# Patient Record
Sex: MALE | Race: WHITE | NOT HISPANIC OR LATINO | URBAN - METROPOLITAN AREA
[De-identification: names, ages, dates, MRNs, and addresses within clinical notes are randomized per-mention and may not be internally consistent; named-entity substitution may affect disease eponyms.]

---

## 2021-01-15 DIAGNOSIS — Z23 NEED FOR VACCINATION: ICD-10-CM

## 2025-05-17 ENCOUNTER — HOSPITAL ENCOUNTER (OUTPATIENT)
Dept: RADIOLOGY | Facility: MEDICAL CENTER | Age: 75
End: 2025-05-17
Payer: MEDICARE

## 2025-05-17 ENCOUNTER — APPOINTMENT (OUTPATIENT)
Dept: RADIOLOGY | Facility: MEDICAL CENTER | Age: 75
DRG: 270 | End: 2025-05-17
Payer: MEDICARE

## 2025-05-17 ENCOUNTER — APPOINTMENT (OUTPATIENT)
Dept: RADIOLOGY | Facility: MEDICAL CENTER | Age: 75
DRG: 270 | End: 2025-05-17
Attending: EMERGENCY MEDICINE
Payer: MEDICARE

## 2025-05-17 ENCOUNTER — HOSPITAL ENCOUNTER (INPATIENT)
Facility: MEDICAL CENTER | Age: 75
LOS: 6 days | DRG: 270 | End: 2025-05-23
Attending: EMERGENCY MEDICINE | Admitting: STUDENT IN AN ORGANIZED HEALTH CARE EDUCATION/TRAINING PROGRAM
Payer: MEDICARE

## 2025-05-17 DIAGNOSIS — K55.039 ACUTE ISCHEMIC COLITIS (HCC): ICD-10-CM

## 2025-05-17 DIAGNOSIS — N17.9 ACUTE KIDNEY INJURY SUPERIMPOSED ON CHRONIC KIDNEY DISEASE (HCC): ICD-10-CM

## 2025-05-17 DIAGNOSIS — N18.9 ACUTE KIDNEY INJURY SUPERIMPOSED ON CHRONIC KIDNEY DISEASE (HCC): ICD-10-CM

## 2025-05-17 DIAGNOSIS — T14.90XA TRAUMA: Primary | ICD-10-CM

## 2025-05-17 DIAGNOSIS — K25.4 GASTROINTESTINAL HEMORRHAGE ASSOCIATED WITH GASTRIC ULCER: ICD-10-CM

## 2025-05-17 DIAGNOSIS — D50.0 ANEMIA DUE TO BLOOD LOSS: ICD-10-CM

## 2025-05-17 DIAGNOSIS — K52.9 COLITIS: ICD-10-CM

## 2025-05-17 PROBLEM — R55 SYNCOPAL EPISODES: Status: ACTIVE | Noted: 2025-05-17

## 2025-05-17 PROBLEM — D69.6 THROMBOCYTOPENIA (HCC): Status: ACTIVE | Noted: 2025-05-17

## 2025-05-17 PROBLEM — R35.0 BENIGN PROSTATIC HYPERPLASIA WITH URINARY FREQUENCY: Chronic | Status: ACTIVE | Noted: 2025-05-17

## 2025-05-17 PROBLEM — N40.1 BENIGN PROSTATIC HYPERPLASIA WITH URINARY FREQUENCY: Chronic | Status: ACTIVE | Noted: 2025-05-17

## 2025-05-17 PROBLEM — R94.31 PROLONGED Q-T INTERVAL ON ECG: Status: ACTIVE | Noted: 2025-05-17

## 2025-05-17 PROBLEM — K72.10 CHRONIC LIVER FAILURE WITHOUT HEPATIC COMA (HCC): Status: ACTIVE | Noted: 2025-05-17

## 2025-05-17 PROBLEM — R18.8 CIRRHOSIS OF LIVER WITH ASCITES (HCC): Chronic | Status: ACTIVE | Noted: 2025-05-17

## 2025-05-17 PROBLEM — Z53.09 CONTRAINDICATION TO DEEP VEIN THROMBOSIS (DVT) PROPHYLAXIS: Status: ACTIVE | Noted: 2025-05-17

## 2025-05-17 PROBLEM — S32.401A CLOSED FRACTURE OF RIGHT ACETABULUM (HCC): Status: ACTIVE | Noted: 2025-05-17

## 2025-05-17 PROBLEM — D68.9 COAGULOPATHY (HCC): Status: ACTIVE | Noted: 2025-05-17

## 2025-05-17 PROBLEM — R57.1 HYPOVOLEMIC SHOCK (HCC): Status: ACTIVE | Noted: 2025-05-17

## 2025-05-17 PROBLEM — K74.60 CIRRHOSIS OF LIVER WITH ASCITES (HCC): Chronic | Status: ACTIVE | Noted: 2025-05-17

## 2025-05-17 PROBLEM — K92.0 HEMATEMESIS WITH NAUSEA: Status: ACTIVE | Noted: 2025-05-17

## 2025-05-17 PROBLEM — R79.89 ELEVATED SERUM CREATININE: Status: ACTIVE | Noted: 2025-05-17

## 2025-05-17 LAB
ABO + RH BLD: NORMAL
ABO GROUP BLD: NORMAL
ALBUMIN SERPL BCP-MCNC: 2.5 G/DL (ref 3.2–4.9)
ALBUMIN/GLOB SERPL: 1.5 G/DL
ALP SERPL-CCNC: 46 U/L (ref 30–99)
ALT SERPL-CCNC: 16 U/L (ref 2–50)
ANION GAP SERPL CALC-SCNC: 16 MMOL/L (ref 7–16)
APTT PPP: 43 SEC (ref 24.7–36)
AST SERPL-CCNC: 36 U/L (ref 12–45)
BARCODED ABORH UBTYP: 5100
BARCODED ABORH UBTYP: 6200
BARCODED PRD CODE UBPRD: NORMAL
BARCODED PRD CODE UBPRD: NORMAL
BARCODED UNIT NUM UBUNT: NORMAL
BARCODED UNIT NUM UBUNT: NORMAL
BILIRUB SERPL-MCNC: 2.3 MG/DL (ref 0.1–1.5)
BLD GP AB SCN SERPL QL: NORMAL
BUN SERPL-MCNC: 27 MG/DL (ref 8–22)
CALCIUM ALBUM COR SERPL-MCNC: 9.6 MG/DL (ref 8.5–10.5)
CALCIUM SERPL-MCNC: 8.4 MG/DL (ref 8.5–10.5)
CFT BLD TEG: 5.6 MIN (ref 4.6–9.1)
CFT P HPASE BLD TEG: 5.4 MIN (ref 4.3–8.3)
CHLORIDE SERPL-SCNC: 105 MMOL/L (ref 96–112)
CK SERPL-CCNC: 99 U/L (ref 0–154)
CLOT ANGLE BLD TEG: 58 DEGREES (ref 63–78)
CLOT LYSIS 30M P MA LENFR BLD TEG: 0 % (ref 0–2.6)
CO2 SERPL-SCNC: 13 MMOL/L (ref 20–33)
COMPONENT P 8504P: NORMAL
COMPONENT RW2 8504W: NORMAL
CREAT SERPL-MCNC: 2.24 MG/DL (ref 0.5–1.4)
CT.EXTRINSIC BLD ROTEM: >5 MIN (ref 0.8–2.1)
ERYTHROCYTE [DISTWIDTH] IN BLOOD BY AUTOMATED COUNT: 60.5 FL (ref 35.9–50)
ETHANOL BLD-MCNC: <10.1 MG/DL
GFR SERPLBLD CREATININE-BSD FMLA CKD-EPI: 30 ML/MIN/1.73 M 2
GLOBULIN SER CALC-MCNC: 1.7 G/DL (ref 1.9–3.5)
GLUCOSE BLD STRIP.AUTO-MCNC: 155 MG/DL (ref 65–99)
GLUCOSE SERPL-MCNC: 182 MG/DL (ref 65–99)
HCT VFR BLD AUTO: 25.8 % (ref 42–52)
HGB BLD-MCNC: 8.4 G/DL (ref 14–18)
INR PPP: 3.14 (ref 0.87–1.13)
MCF BLD TEG: <40 MM (ref 52–69)
MCF.PLATELET INHIB BLD ROTEM: <4 MM (ref 15–32)
MCH RBC QN AUTO: 37.3 PG (ref 27–33)
MCHC RBC AUTO-ENTMCNC: 32.6 G/DL (ref 32.3–36.5)
MCV RBC AUTO: 114.7 FL (ref 81.4–97.8)
PA AA BLD-ACNC: ABNORMAL % (ref 0–11)
PA ADP BLD-ACNC: ABNORMAL % (ref 0–17)
PLATELET # BLD AUTO: 69 K/UL (ref 164–446)
PLATELETS.RETICULATED NFR BLD AUTO: 5.5 % (ref 0.6–13.1)
PMV BLD AUTO: 11.4 FL (ref 9–12.9)
POTASSIUM SERPL-SCNC: 4.9 MMOL/L (ref 3.6–5.5)
PRODUCT TYPE UPROD: NORMAL
PRODUCT TYPE UPROD: NORMAL
PROT SERPL-MCNC: 4.2 G/DL (ref 6–8.2)
PROTHROMBIN TIME: 32.5 SEC (ref 12–14.6)
RBC # BLD AUTO: 2.25 M/UL (ref 4.7–6.1)
RH BLD: NORMAL
SODIUM SERPL-SCNC: 134 MMOL/L (ref 135–145)
TEG ALGORITHM TGALG: ABNORMAL
UNIT STATUS USTAT: NORMAL
UNIT STATUS USTAT: NORMAL
WBC # BLD AUTO: 14.8 K/UL (ref 4.8–10.8)

## 2025-05-17 PROCEDURE — 82077 ASSAY SPEC XCP UR&BREATH IA: CPT

## 2025-05-17 PROCEDURE — 85384 FIBRINOGEN ACTIVITY: CPT | Mod: 91

## 2025-05-17 PROCEDURE — 85610 PROTHROMBIN TIME: CPT

## 2025-05-17 PROCEDURE — 700111 HCHG RX REV CODE 636 W/ 250 OVERRIDE (IP): Mod: JZ

## 2025-05-17 PROCEDURE — 93005 ELECTROCARDIOGRAM TRACING: CPT | Mod: TC

## 2025-05-17 PROCEDURE — 700105 HCHG RX REV CODE 258

## 2025-05-17 PROCEDURE — G0390 TRAUMA RESPONS W/HOSP CRITI: HCPCS

## 2025-05-17 PROCEDURE — 36245 INS CATH ABD/L-EXT ART 1ST: CPT | Mod: XS

## 2025-05-17 PROCEDURE — P9010 WHOLE BLOOD FOR TRANSFUSION: HCPCS

## 2025-05-17 PROCEDURE — 86901 BLOOD TYPING SEROLOGIC RH(D): CPT | Mod: 91

## 2025-05-17 PROCEDURE — 85055 RETICULATED PLATELET ASSAY: CPT

## 2025-05-17 PROCEDURE — 85027 COMPLETE CBC AUTOMATED: CPT

## 2025-05-17 PROCEDURE — 86850 RBC ANTIBODY SCREEN: CPT

## 2025-05-17 PROCEDURE — 80053 COMPREHEN METABOLIC PANEL: CPT

## 2025-05-17 PROCEDURE — 75736 ARTERY X-RAYS PELVIS: CPT | Mod: XU

## 2025-05-17 PROCEDURE — 85576 BLOOD PLATELET AGGREGATION: CPT

## 2025-05-17 PROCEDURE — 72125 CT NECK SPINE W/O DYE: CPT

## 2025-05-17 PROCEDURE — 30233R1 TRANSFUSION OF NONAUTOLOGOUS PLATELETS INTO PERIPHERAL VEIN, PERCUTANEOUS APPROACH: ICD-10-PCS | Performed by: STUDENT IN AN ORGANIZED HEALTH CARE EDUCATION/TRAINING PROGRAM

## 2025-05-17 PROCEDURE — 84100 ASSAY OF PHOSPHORUS: CPT

## 2025-05-17 PROCEDURE — 770022 HCHG ROOM/CARE - ICU (200)

## 2025-05-17 PROCEDURE — 99291 CRITICAL CARE FIRST HOUR: CPT

## 2025-05-17 PROCEDURE — 82550 ASSAY OF CK (CPK): CPT

## 2025-05-17 PROCEDURE — 36430 TRANSFUSION BLD/BLD COMPNT: CPT

## 2025-05-17 PROCEDURE — 36415 COLL VENOUS BLD VENIPUNCTURE: CPT

## 2025-05-17 PROCEDURE — 36246 INS CATH ABD/L-EXT ART 2ND: CPT

## 2025-05-17 PROCEDURE — 700111 HCHG RX REV CODE 636 W/ 250 OVERRIDE (IP): Performed by: STUDENT IN AN ORGANIZED HEALTH CARE EDUCATION/TRAINING PROGRAM

## 2025-05-17 PROCEDURE — B41C1ZZ FLUOROSCOPY OF PELVIC ARTERIES USING LOW OSMOLAR CONTRAST: ICD-10-PCS | Performed by: RADIOLOGY

## 2025-05-17 PROCEDURE — 82962 GLUCOSE BLOOD TEST: CPT

## 2025-05-17 PROCEDURE — C1894 INTRO/SHEATH, NON-LASER: HCPCS

## 2025-05-17 PROCEDURE — 700102 HCHG RX REV CODE 250 W/ 637 OVERRIDE(OP)

## 2025-05-17 PROCEDURE — 700117 HCHG RX CONTRAST REV CODE 255: Performed by: RADIOLOGY

## 2025-05-17 PROCEDURE — 700101 HCHG RX REV CODE 250

## 2025-05-17 PROCEDURE — 85347 COAGULATION TIME ACTIVATED: CPT

## 2025-05-17 PROCEDURE — 99291 CRITICAL CARE FIRST HOUR: CPT | Performed by: STUDENT IN AN ORGANIZED HEALTH CARE EDUCATION/TRAINING PROGRAM

## 2025-05-17 PROCEDURE — 85730 THROMBOPLASTIN TIME PARTIAL: CPT

## 2025-05-17 PROCEDURE — 6A550Z3 PHERESIS OF PLASMA, SINGLE: ICD-10-PCS | Performed by: STUDENT IN AN ORGANIZED HEALTH CARE EDUCATION/TRAINING PROGRAM

## 2025-05-17 PROCEDURE — 37244 VASC EMBOLIZE/OCCLUDE BLEED: CPT

## 2025-05-17 PROCEDURE — P9012 CRYOPRECIPITATE EACH UNIT: HCPCS | Mod: 91

## 2025-05-17 PROCEDURE — 72170 X-RAY EXAM OF PELVIS: CPT

## 2025-05-17 PROCEDURE — 30233N1 TRANSFUSION OF NONAUTOLOGOUS RED BLOOD CELLS INTO PERIPHERAL VEIN, PERCUTANEOUS APPROACH: ICD-10-PCS | Performed by: STUDENT IN AN ORGANIZED HEALTH CARE EDUCATION/TRAINING PROGRAM

## 2025-05-17 PROCEDURE — 83735 ASSAY OF MAGNESIUM: CPT

## 2025-05-17 PROCEDURE — 86900 BLOOD TYPING SEROLOGIC ABO: CPT

## 2025-05-17 PROCEDURE — P9034 PLATELETS, PHERESIS: HCPCS

## 2025-05-17 PROCEDURE — 700111 HCHG RX REV CODE 636 W/ 250 OVERRIDE (IP)

## 2025-05-17 PROCEDURE — A9270 NON-COVERED ITEM OR SERVICE: HCPCS

## 2025-05-17 PROCEDURE — 96374 THER/PROPH/DIAG INJ IV PUSH: CPT

## 2025-05-17 PROCEDURE — 04LE3DZ OCCLUSION OF RIGHT INTERNAL ILIAC ARTERY WITH INTRALUMINAL DEVICE, PERCUTANEOUS APPROACH: ICD-10-PCS | Performed by: RADIOLOGY

## 2025-05-17 RX ORDER — DOCUSATE SODIUM 100 MG/1
100 CAPSULE, LIQUID FILLED ORAL 2 TIMES DAILY
Status: DISCONTINUED | OUTPATIENT
Start: 2025-05-17 | End: 2025-05-23

## 2025-05-17 RX ORDER — POLYETHYLENE GLYCOL 3350 17 G/17G
1 POWDER, FOR SOLUTION ORAL 2 TIMES DAILY
Status: DISCONTINUED | OUTPATIENT
Start: 2025-05-17 | End: 2025-05-23

## 2025-05-17 RX ORDER — BISACODYL 10 MG
10 SUPPOSITORY, RECTAL RECTAL
Status: DISCONTINUED | OUTPATIENT
Start: 2025-05-17 | End: 2025-05-23 | Stop reason: HOSPADM

## 2025-05-17 RX ORDER — SODIUM CHLORIDE 9 MG/ML
INJECTION, SOLUTION INTRAVENOUS CONTINUOUS
Status: DISCONTINUED | OUTPATIENT
Start: 2025-05-17 | End: 2025-05-18

## 2025-05-17 RX ORDER — MIDAZOLAM HYDROCHLORIDE 1 MG/ML
.5-2 INJECTION INTRAMUSCULAR; INTRAVENOUS PRN
Status: DISCONTINUED | OUTPATIENT
Start: 2025-05-17 | End: 2025-05-17 | Stop reason: HOSPADM

## 2025-05-17 RX ORDER — ONDANSETRON 4 MG/1
4 TABLET, ORALLY DISINTEGRATING ORAL EVERY 4 HOURS PRN
Status: DISCONTINUED | OUTPATIENT
Start: 2025-05-17 | End: 2025-05-17

## 2025-05-17 RX ORDER — ONDANSETRON 2 MG/ML
4 INJECTION INTRAMUSCULAR; INTRAVENOUS EVERY 4 HOURS PRN
Status: DISCONTINUED | OUTPATIENT
Start: 2025-05-17 | End: 2025-05-17

## 2025-05-17 RX ORDER — OXYCODONE HYDROCHLORIDE 5 MG/1
5 TABLET ORAL
Refills: 0 | Status: DISCONTINUED | OUTPATIENT
Start: 2025-05-17 | End: 2025-05-23 | Stop reason: HOSPADM

## 2025-05-17 RX ORDER — ONDANSETRON 2 MG/ML
INJECTION INTRAMUSCULAR; INTRAVENOUS
Status: COMPLETED
Start: 2025-05-17 | End: 2025-05-17

## 2025-05-17 RX ORDER — MIDAZOLAM HYDROCHLORIDE 1 MG/ML
INJECTION INTRAMUSCULAR; INTRAVENOUS
Status: COMPLETED
Start: 2025-05-17 | End: 2025-05-17

## 2025-05-17 RX ORDER — ONDANSETRON 2 MG/ML
4 INJECTION INTRAMUSCULAR; INTRAVENOUS PRN
Status: DISCONTINUED | OUTPATIENT
Start: 2025-05-17 | End: 2025-05-17 | Stop reason: HOSPADM

## 2025-05-17 RX ORDER — METAXALONE 400 MG/1
400 TABLET ORAL 2 TIMES DAILY
Status: DISCONTINUED | OUTPATIENT
Start: 2025-05-17 | End: 2025-05-23 | Stop reason: HOSPADM

## 2025-05-17 RX ORDER — SODIUM PHOSPHATE,MONO-DIBASIC 19G-7G/118
1 ENEMA (ML) RECTAL
Status: DISCONTINUED | OUTPATIENT
Start: 2025-05-17 | End: 2025-05-23 | Stop reason: HOSPADM

## 2025-05-17 RX ORDER — OXYCODONE HYDROCHLORIDE 5 MG/1
2.5 TABLET ORAL
Refills: 0 | Status: DISCONTINUED | OUTPATIENT
Start: 2025-05-17 | End: 2025-05-23 | Stop reason: HOSPADM

## 2025-05-17 RX ORDER — DEXTROSE MONOHYDRATE 25 G/50ML
25 INJECTION, SOLUTION INTRAVENOUS
Status: DISCONTINUED | OUTPATIENT
Start: 2025-05-17 | End: 2025-05-23 | Stop reason: HOSPADM

## 2025-05-17 RX ORDER — INSULIN LISPRO 100 [IU]/ML
1-6 INJECTION, SOLUTION INTRAVENOUS; SUBCUTANEOUS EVERY 6 HOURS
Status: DISCONTINUED | OUTPATIENT
Start: 2025-05-18 | End: 2025-05-23 | Stop reason: HOSPADM

## 2025-05-17 RX ORDER — AMOXICILLIN 250 MG
1 CAPSULE ORAL NIGHTLY
Status: DISCONTINUED | OUTPATIENT
Start: 2025-05-17 | End: 2025-05-23 | Stop reason: HOSPADM

## 2025-05-17 RX ORDER — GABAPENTIN 100 MG/1
100 CAPSULE ORAL EVERY 8 HOURS
Status: DISCONTINUED | OUTPATIENT
Start: 2025-05-17 | End: 2025-05-23 | Stop reason: HOSPADM

## 2025-05-17 RX ORDER — PANTOPRAZOLE SODIUM 40 MG/10ML
40 INJECTION, POWDER, LYOPHILIZED, FOR SOLUTION INTRAVENOUS 2 TIMES DAILY
Status: DISCONTINUED | OUTPATIENT
Start: 2025-05-17 | End: 2025-05-23 | Stop reason: HOSPADM

## 2025-05-17 RX ORDER — HYDROMORPHONE HYDROCHLORIDE 1 MG/ML
0.25 INJECTION, SOLUTION INTRAMUSCULAR; INTRAVENOUS; SUBCUTANEOUS
Status: DISCONTINUED | OUTPATIENT
Start: 2025-05-17 | End: 2025-05-23 | Stop reason: HOSPADM

## 2025-05-17 RX ORDER — AMOXICILLIN 250 MG
1 CAPSULE ORAL
Status: DISCONTINUED | OUTPATIENT
Start: 2025-05-17 | End: 2025-05-23 | Stop reason: HOSPADM

## 2025-05-17 RX ORDER — SODIUM CHLORIDE, SODIUM LACTATE, POTASSIUM CHLORIDE, CALCIUM CHLORIDE 600; 310; 30; 20 MG/100ML; MG/100ML; MG/100ML; MG/100ML
INJECTION, SOLUTION INTRAVENOUS CONTINUOUS
Status: DISCONTINUED | OUTPATIENT
Start: 2025-05-17 | End: 2025-05-19

## 2025-05-17 RX ORDER — LIDOCAINE 4 G/G
1 PATCH TOPICAL EVERY 24 HOURS
Status: DISCONTINUED | OUTPATIENT
Start: 2025-05-17 | End: 2025-05-23 | Stop reason: HOSPADM

## 2025-05-17 RX ORDER — BACITRACIN ZINC AND POLYMYXIN B SULFATE 500; 1000 [USP'U]/G; [USP'U]/G
OINTMENT TOPICAL 3 TIMES DAILY
Status: DISCONTINUED | OUTPATIENT
Start: 2025-05-17 | End: 2025-05-23 | Stop reason: HOSPADM

## 2025-05-17 RX ORDER — SODIUM CHLORIDE 9 MG/ML
500 INJECTION, SOLUTION INTRAVENOUS
Status: DISCONTINUED | OUTPATIENT
Start: 2025-05-17 | End: 2025-05-17 | Stop reason: HOSPADM

## 2025-05-17 RX ORDER — ONDANSETRON 2 MG/ML
INJECTION INTRAMUSCULAR; INTRAVENOUS
Status: COMPLETED | OUTPATIENT
Start: 2025-05-17 | End: 2025-05-17

## 2025-05-17 RX ADMIN — POLYETHYLENE GLYCOL 3350 1 PACKET: 17 POWDER, FOR SOLUTION ORAL at 23:07

## 2025-05-17 RX ADMIN — Medication 1 EACH: at 22:58

## 2025-05-17 RX ADMIN — IOHEXOL 60 ML: 300 INJECTION, SOLUTION INTRAVENOUS at 22:00

## 2025-05-17 RX ADMIN — ONDANSETRON 4 MG: 2 INJECTION INTRAMUSCULAR; INTRAVENOUS at 20:36

## 2025-05-17 RX ADMIN — GABAPENTIN 100 MG: 100 CAPSULE ORAL at 23:06

## 2025-05-17 RX ADMIN — DOCUSATE SODIUM 100 MG: 100 CAPSULE, LIQUID FILLED ORAL at 23:06

## 2025-05-17 RX ADMIN — SENNOSIDES, DOCUSATE SODIUM 1 TABLET: 50; 8.6 TABLET, FILM COATED ORAL at 23:06

## 2025-05-17 RX ADMIN — PANTOPRAZOLE SODIUM 40 MG: 40 INJECTION, POWDER, FOR SOLUTION INTRAVENOUS at 22:58

## 2025-05-17 RX ADMIN — FENTANYL CITRATE 50 MCG: 50 INJECTION, SOLUTION INTRAMUSCULAR; INTRAVENOUS at 22:13

## 2025-05-17 RX ADMIN — LIDOCAINE 1 PATCH: 4 PATCH TOPICAL at 22:58

## 2025-05-17 RX ADMIN — SODIUM CHLORIDE, POTASSIUM CHLORIDE, SODIUM LACTATE AND CALCIUM CHLORIDE: 600; 310; 30; 20 INJECTION, SOLUTION INTRAVENOUS at 23:37

## 2025-05-17 RX ADMIN — METAXALONE 400 MG: 800 TABLET ORAL at 23:06

## 2025-05-17 RX ADMIN — INSULIN LISPRO 1 UNITS: 100 INJECTION, SOLUTION INTRAVENOUS; SUBCUTANEOUS at 23:34

## 2025-05-17 ASSESSMENT — PAIN DESCRIPTION - PAIN TYPE
TYPE: ACUTE PAIN

## 2025-05-18 ENCOUNTER — APPOINTMENT (OUTPATIENT)
Dept: RADIOLOGY | Facility: MEDICAL CENTER | Age: 75
DRG: 270 | End: 2025-05-18
Payer: MEDICARE
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
## 2025-05-18 PROBLEM — Z01.89 ENCOUNTER FOR GERIATRIC ASSESSMENT: Status: ACTIVE | Noted: 2025-05-18

## 2025-05-18 LAB
ALBUMIN SERPL BCP-MCNC: 2.9 G/DL (ref 3.2–4.9)
ALBUMIN/GLOB SERPL: 1.4 G/DL
ALP SERPL-CCNC: 48 U/L (ref 30–99)
ALT SERPL-CCNC: 20 U/L (ref 2–50)
ANION GAP SERPL CALC-SCNC: 14 MMOL/L (ref 7–16)
ANISOCYTOSIS BLD QL SMEAR: ABNORMAL
AST SERPL-CCNC: 36 U/L (ref 12–45)
BARCODED ABORH UBTYP: 5100
BARCODED ABORH UBTYP: 600
BARCODED ABORH UBTYP: 600
BARCODED ABORH UBTYP: 6200
BARCODED ABORH UBTYP: 8400
BARCODED PRD CODE UBPRD: NORMAL
BARCODED UNIT NUM UBUNT: NORMAL
BASOPHILS # BLD AUTO: 0.1 % (ref 0–1.8)
BASOPHILS # BLD: 0.01 K/UL (ref 0–0.12)
BILIRUB SERPL-MCNC: 3.3 MG/DL (ref 0.1–1.5)
BUN SERPL-MCNC: 29 MG/DL (ref 8–22)
CALCIUM ALBUM COR SERPL-MCNC: 9.5 MG/DL (ref 8.5–10.5)
CALCIUM SERPL-MCNC: 8.6 MG/DL (ref 8.5–10.5)
CFT BLD TEG: 3.1 MIN (ref 4.6–9.1)
CFT BLD TEG: 3.6 MIN (ref 4.6–9.1)
CFT BLD TEG: 4.7 MIN (ref 4.6–9.1)
CFT BLD TEG: 5.5 MIN (ref 4.6–9.1)
CFT P HPASE BLD TEG: 3.2 MIN (ref 4.3–8.3)
CFT P HPASE BLD TEG: 3.9 MIN (ref 4.3–8.3)
CFT P HPASE BLD TEG: 4.5 MIN (ref 4.3–8.3)
CFT P HPASE BLD TEG: 5.2 MIN (ref 4.3–8.3)
CHLORIDE SERPL-SCNC: 108 MMOL/L (ref 96–112)
CLOT ANGLE BLD TEG: 67.6 DEGREES (ref 63–78)
CLOT ANGLE BLD TEG: 70.5 DEGREES (ref 63–78)
CLOT ANGLE BLD TEG: 71.1 DEGREES (ref 63–78)
CLOT ANGLE BLD TEG: 72 DEGREES (ref 63–78)
CO2 SERPL-SCNC: 14 MMOL/L (ref 20–33)
COMMENT 1642: NORMAL
COMPONENT CT 8504CT: NORMAL
COMPONENT CT 8504CT: NORMAL
COMPONENT P 8504P: NORMAL
COMPONENT P 8504P: NORMAL
COMPONENT R 8504R: NORMAL
CREAT SERPL-MCNC: 2.2 MG/DL (ref 0.5–1.4)
CT.EXTRINSIC BLD ROTEM: 1.6 MIN (ref 0.8–2.1)
CT.EXTRINSIC BLD ROTEM: 1.7 MIN (ref 0.8–2.1)
CT.EXTRINSIC BLD ROTEM: 1.8 MIN (ref 0.8–2.1)
CT.EXTRINSIC BLD ROTEM: 2.3 MIN (ref 0.8–2.1)
EKG IMPRESSION: NORMAL
EOSINOPHIL # BLD AUTO: 0 K/UL (ref 0–0.51)
EOSINOPHIL NFR BLD: 0 % (ref 0–6.9)
ERYTHROCYTE [DISTWIDTH] IN BLOOD BY AUTOMATED COUNT: 68.3 FL (ref 35.9–50)
GFR SERPLBLD CREATININE-BSD FMLA CKD-EPI: 31 ML/MIN/1.73 M 2
GLOBULIN SER CALC-MCNC: 2.1 G/DL (ref 1.9–3.5)
GLUCOSE BLD STRIP.AUTO-MCNC: 102 MG/DL (ref 65–99)
GLUCOSE BLD STRIP.AUTO-MCNC: 110 MG/DL (ref 65–99)
GLUCOSE BLD STRIP.AUTO-MCNC: 133 MG/DL (ref 65–99)
GLUCOSE SERPL-MCNC: 151 MG/DL (ref 65–99)
HCT VFR BLD AUTO: 22.7 % (ref 42–52)
HGB BLD-MCNC: 6.6 G/DL (ref 14–18)
HGB BLD-MCNC: 6.7 G/DL (ref 14–18)
HGB BLD-MCNC: 6.8 G/DL (ref 14–18)
HGB BLD-MCNC: 7.6 G/DL (ref 14–18)
IMM GRANULOCYTES # BLD AUTO: 0.12 K/UL (ref 0–0.11)
IMM GRANULOCYTES NFR BLD AUTO: 0.8 % (ref 0–0.9)
INR PPP: 1.88 (ref 0.87–1.13)
INR PPP: 2.01 (ref 0.87–1.13)
LACTATE SERPL-SCNC: 3.8 MMOL/L (ref 0.5–2)
LACTATE SERPL-SCNC: 5.9 MMOL/L (ref 0.5–2)
LYMPHOCYTES # BLD AUTO: 0.76 K/UL (ref 1–4.8)
LYMPHOCYTES NFR BLD: 5.1 % (ref 22–41)
MACROCYTES BLD QL SMEAR: ABNORMAL
MAGNESIUM SERPL-MCNC: 1.5 MG/DL (ref 1.5–2.5)
MCF BLD TEG: 46 MM (ref 52–69)
MCF BLD TEG: 52.9 MM (ref 52–69)
MCF BLD TEG: 53.6 MM (ref 52–69)
MCF BLD TEG: 53.7 MM (ref 52–69)
MCF.PLATELET INHIB BLD ROTEM: 13.6 MM (ref 15–32)
MCF.PLATELET INHIB BLD ROTEM: 14.6 MM (ref 15–32)
MCF.PLATELET INHIB BLD ROTEM: 15.4 MM (ref 15–32)
MCF.PLATELET INHIB BLD ROTEM: 7.7 MM (ref 15–32)
MCH RBC QN AUTO: 35.3 PG (ref 27–33)
MCHC RBC AUTO-ENTMCNC: 33.5 G/DL (ref 32.3–36.5)
MCV RBC AUTO: 105.6 FL (ref 81.4–97.8)
MONOCYTES # BLD AUTO: 1.52 K/UL (ref 0–0.85)
MONOCYTES NFR BLD AUTO: 10.2 % (ref 0–13.4)
MORPHOLOGY BLD-IMP: NORMAL
NEUTROPHILS # BLD AUTO: 12.52 K/UL (ref 1.82–7.42)
NEUTROPHILS NFR BLD: 83.8 % (ref 44–72)
NRBC # BLD AUTO: 0 K/UL
NRBC BLD-RTO: 0 /100 WBC (ref 0–0.2)
PA AA BLD-ACNC: 81.4 % (ref 0–11)
PA AA BLD-ACNC: 87 % (ref 0–11)
PA AA BLD-ACNC: ABNORMAL % (ref 0–11)
PA ADP BLD-ACNC: 87.9 % (ref 0–17)
PA ADP BLD-ACNC: 89.4 % (ref 0–17)
PA ADP BLD-ACNC: ABNORMAL % (ref 0–17)
PHOSPHATE SERPL-MCNC: 4.9 MG/DL (ref 2.5–4.5)
PLATELET # BLD AUTO: 101 K/UL (ref 164–446)
PLATELET BLD QL SMEAR: NORMAL
PMV BLD AUTO: 9.8 FL (ref 9–12.9)
POIKILOCYTOSIS BLD QL SMEAR: NORMAL
POTASSIUM SERPL-SCNC: 5.4 MMOL/L (ref 3.6–5.5)
PRODUCT TYPE UPROD: NORMAL
PROT SERPL-MCNC: 5 G/DL (ref 6–8.2)
PROTHROMBIN TIME: 21.7 SEC (ref 12–14.6)
PROTHROMBIN TIME: 22.9 SEC (ref 12–14.6)
RBC # BLD AUTO: 2.15 M/UL (ref 4.7–6.1)
RBC BLD AUTO: PRESENT
SODIUM SERPL-SCNC: 136 MMOL/L (ref 135–145)
TEG ALGORITHM TGALG: ABNORMAL
UNIT STATUS USTAT: NORMAL
WBC # BLD AUTO: 14.9 K/UL (ref 4.8–10.8)

## 2025-05-18 PROCEDURE — 700101 HCHG RX REV CODE 250

## 2025-05-18 PROCEDURE — 85576 BLOOD PLATELET AGGREGATION: CPT | Mod: 91

## 2025-05-18 PROCEDURE — 82962 GLUCOSE BLOOD TEST: CPT | Mod: 91

## 2025-05-18 PROCEDURE — 700111 HCHG RX REV CODE 636 W/ 250 OVERRIDE (IP): Performed by: STUDENT IN AN ORGANIZED HEALTH CARE EDUCATION/TRAINING PROGRAM

## 2025-05-18 PROCEDURE — P9016 RBC LEUKOCYTES REDUCED: HCPCS

## 2025-05-18 PROCEDURE — 700111 HCHG RX REV CODE 636 W/ 250 OVERRIDE (IP)

## 2025-05-18 PROCEDURE — 93970 EXTREMITY STUDY: CPT

## 2025-05-18 PROCEDURE — 80053 COMPREHEN METABOLIC PANEL: CPT

## 2025-05-18 PROCEDURE — P9012 CRYOPRECIPITATE EACH UNIT: HCPCS | Mod: 91

## 2025-05-18 PROCEDURE — 700105 HCHG RX REV CODE 258: Performed by: STUDENT IN AN ORGANIZED HEALTH CARE EDUCATION/TRAINING PROGRAM

## 2025-05-18 PROCEDURE — 85018 HEMOGLOBIN: CPT

## 2025-05-18 PROCEDURE — 83605 ASSAY OF LACTIC ACID: CPT

## 2025-05-18 PROCEDURE — P9034 PLATELETS, PHERESIS: HCPCS

## 2025-05-18 PROCEDURE — 93970 EXTREMITY STUDY: CPT | Mod: 26 | Performed by: INTERNAL MEDICINE

## 2025-05-18 PROCEDURE — 700105 HCHG RX REV CODE 258

## 2025-05-18 PROCEDURE — A9270 NON-COVERED ITEM OR SERVICE: HCPCS | Performed by: INTERNAL MEDICINE

## 2025-05-18 PROCEDURE — 86923 COMPATIBILITY TEST ELECTRIC: CPT

## 2025-05-18 PROCEDURE — 99233 SBSQ HOSP IP/OBS HIGH 50: CPT | Performed by: PHYSICIAN ASSISTANT

## 2025-05-18 PROCEDURE — 85347 COAGULATION TIME ACTIVATED: CPT | Mod: 91

## 2025-05-18 PROCEDURE — 700102 HCHG RX REV CODE 250 W/ 637 OVERRIDE(OP)

## 2025-05-18 PROCEDURE — 770022 HCHG ROOM/CARE - ICU (200)

## 2025-05-18 PROCEDURE — 700102 HCHG RX REV CODE 250 W/ 637 OVERRIDE(OP): Performed by: INTERNAL MEDICINE

## 2025-05-18 PROCEDURE — 93010 ELECTROCARDIOGRAM REPORT: CPT | Performed by: INTERNAL MEDICINE

## 2025-05-18 PROCEDURE — 85025 COMPLETE CBC W/AUTO DIFF WBC: CPT

## 2025-05-18 PROCEDURE — 85610 PROTHROMBIN TIME: CPT

## 2025-05-18 PROCEDURE — 85384 FIBRINOGEN ACTIVITY: CPT | Mod: 91

## 2025-05-18 PROCEDURE — A9270 NON-COVERED ITEM OR SERVICE: HCPCS

## 2025-05-18 PROCEDURE — 36430 TRANSFUSION BLD/BLD COMPNT: CPT

## 2025-05-18 PROCEDURE — 99222 1ST HOSP IP/OBS MODERATE 55: CPT | Performed by: INTERNAL MEDICINE

## 2025-05-18 RX ORDER — FOLIC ACID 0.8 MG
800 TABLET ORAL EVERY MORNING
COMMUNITY

## 2025-05-18 RX ORDER — COVID-19 ANTIGEN TEST
220 KIT MISCELLANEOUS ONCE
COMMUNITY

## 2025-05-18 RX ORDER — PHYTONADIONE 5 MG/1
5 TABLET ORAL ONCE
Status: COMPLETED | OUTPATIENT
Start: 2025-05-18 | End: 2025-05-18

## 2025-05-18 RX ORDER — SODIUM CHLORIDE 9 MG/ML
INJECTION, SOLUTION INTRAVENOUS CONTINUOUS
Status: ACTIVE | OUTPATIENT
Start: 2025-05-18 | End: 2025-05-18

## 2025-05-18 RX ORDER — MAGNESIUM SULFATE HEPTAHYDRATE 40 MG/ML
2 INJECTION, SOLUTION INTRAVENOUS ONCE
Status: COMPLETED | OUTPATIENT
Start: 2025-05-18 | End: 2025-05-18

## 2025-05-18 RX ORDER — LACTULOSE 10 G/15ML
30 SOLUTION ORAL 2 TIMES DAILY
Status: DISCONTINUED | OUTPATIENT
Start: 2025-05-18 | End: 2025-05-23 | Stop reason: HOSPADM

## 2025-05-18 RX ORDER — SODIUM CHLORIDE 9 MG/ML
INJECTION, SOLUTION INTRAVENOUS CONTINUOUS
Status: ACTIVE | OUTPATIENT
Start: 2025-05-18 | End: 2025-05-19

## 2025-05-18 RX ORDER — GAUZE BANDAGE 2" X 2"
100 BANDAGE TOPICAL EVERY MORNING
COMMUNITY

## 2025-05-18 RX ORDER — FUROSEMIDE 40 MG/1
40 TABLET ORAL EVERY MORNING
COMMUNITY

## 2025-05-18 RX ORDER — ASCORBIC ACID 125 MG
2 TABLET,CHEWABLE ORAL EVERY MORNING
COMMUNITY

## 2025-05-18 RX ORDER — BISMUTH SUBSALICYLATE 262 MG/1
262 TABLET, CHEWABLE ORAL ONCE
COMMUNITY

## 2025-05-18 RX ADMIN — GABAPENTIN 100 MG: 100 CAPSULE ORAL at 14:57

## 2025-05-18 RX ADMIN — Medication 1 EACH: at 05:58

## 2025-05-18 RX ADMIN — LIDOCAINE 1 PATCH: 4 PATCH TOPICAL at 21:51

## 2025-05-18 RX ADMIN — SENNOSIDES, DOCUSATE SODIUM 1 TABLET: 50; 8.6 TABLET, FILM COATED ORAL at 21:51

## 2025-05-18 RX ADMIN — MAGNESIUM SULFATE HEPTAHYDRATE 2 G: 2 INJECTION, SOLUTION INTRAVENOUS at 11:19

## 2025-05-18 RX ADMIN — DOCUSATE SODIUM 100 MG: 100 CAPSULE, LIQUID FILLED ORAL at 17:40

## 2025-05-18 RX ADMIN — PANTOPRAZOLE SODIUM 40 MG: 40 INJECTION, POWDER, FOR SOLUTION INTRAVENOUS at 06:03

## 2025-05-18 RX ADMIN — SODIUM CHLORIDE, POTASSIUM CHLORIDE, SODIUM LACTATE AND CALCIUM CHLORIDE: 600; 310; 30; 20 INJECTION, SOLUTION INTRAVENOUS at 14:30

## 2025-05-18 RX ADMIN — PHYTONADIONE 5 MG: 5 TABLET ORAL at 14:57

## 2025-05-18 RX ADMIN — SODIUM CHLORIDE: 9 INJECTION, SOLUTION INTRAVENOUS at 11:16

## 2025-05-18 RX ADMIN — DOCUSATE SODIUM 100 MG: 100 CAPSULE, LIQUID FILLED ORAL at 05:58

## 2025-05-18 RX ADMIN — Medication 1 EACH: at 17:40

## 2025-05-18 RX ADMIN — LACTULOSE 30 ML: 10 SOLUTION ORAL at 17:40

## 2025-05-18 RX ADMIN — GABAPENTIN 100 MG: 100 CAPSULE ORAL at 05:58

## 2025-05-18 RX ADMIN — Medication 1 EACH: at 12:01

## 2025-05-18 RX ADMIN — GABAPENTIN 100 MG: 100 CAPSULE ORAL at 21:51

## 2025-05-18 RX ADMIN — MAGNESIUM HYDROXIDE 30 ML: 2400 SUSPENSION ORAL at 17:40

## 2025-05-18 RX ADMIN — METAXALONE 400 MG: 800 TABLET ORAL at 17:40

## 2025-05-18 RX ADMIN — POLYETHYLENE GLYCOL 3350 1 PACKET: 17 POWDER, FOR SOLUTION ORAL at 17:40

## 2025-05-18 RX ADMIN — PANTOPRAZOLE SODIUM 40 MG: 40 INJECTION, POWDER, FOR SOLUTION INTRAVENOUS at 17:44

## 2025-05-18 RX ADMIN — POLYETHYLENE GLYCOL 3350 1 PACKET: 17 POWDER, FOR SOLUTION ORAL at 05:59

## 2025-05-18 RX ADMIN — METAXALONE 400 MG: 800 TABLET ORAL at 05:58

## 2025-05-18 ASSESSMENT — ENCOUNTER SYMPTOMS
ABDOMINAL PAIN: 0
MYALGIAS: 0
MYALGIAS: 1
BLURRED VISION: 0
PALPITATIONS: 0
ROS GI COMMENTS: BM 5/17
CLAUDICATION: 0
NECK PAIN: 0
SHORTNESS OF BREATH: 0
TINGLING: 0
CHILLS: 0
PHOTOPHOBIA: 0
FOCAL WEAKNESS: 0
SENSORY CHANGE: 0
DIARRHEA: 0
WEAKNESS: 0
COUGH: 0
DOUBLE VISION: 0
CONSTIPATION: 0
HEADACHES: 0
NAUSEA: 0
HEMOPTYSIS: 0
DIZZINESS: 0
WEAKNESS: 1
WEIGHT LOSS: 0
SPEECH CHANGE: 0
VOMITING: 0
ORTHOPNEA: 0
FEVER: 0

## 2025-05-18 ASSESSMENT — PAIN DESCRIPTION - PAIN TYPE
TYPE: ACUTE PAIN

## 2025-05-18 ASSESSMENT — COPD QUESTIONNAIRES
DO YOU EVER COUGH UP ANY MUCUS OR PHLEGM?: NO/ONLY WITH OCCASIONAL COLDS OR INFECTIONS
COPD SCREENING SCORE: 4
DURING THE PAST 4 WEEKS HOW MUCH DID YOU FEEL SHORT OF BREATH: NONE/LITTLE OF THE TIME
HAVE YOU SMOKED AT LEAST 100 CIGARETTES IN YOUR ENTIRE LIFE: YES

## 2025-05-18 ASSESSMENT — LIFESTYLE VARIABLES
CONSUMPTION TOTAL: NEGATIVE
TOTAL SCORE: 0
AVERAGE NUMBER OF DAYS PER WEEK YOU HAVE A DRINK CONTAINING ALCOHOL: 0
HOW MANY TIMES IN THE PAST YEAR HAVE YOU HAD 5 OR MORE DRINKS IN A DAY: 0
ON A TYPICAL DAY WHEN YOU DRINK ALCOHOL HOW MANY DRINKS DO YOU HAVE: 0
EVER FELT BAD OR GUILTY ABOUT YOUR DRINKING: NO
HAVE PEOPLE ANNOYED YOU BY CRITICIZING YOUR DRINKING: NO
HAVE YOU EVER FELT YOU SHOULD CUT DOWN ON YOUR DRINKING: NO
EVER HAD A DRINK FIRST THING IN THE MORNING TO STEADY YOUR NERVES TO GET RID OF A HANGOVER: NO
TOTAL SCORE: 0
TOTAL SCORE: 0
ALCOHOL_USE: NO
DOES PATIENT WANT TO STOP DRINKING: NO

## 2025-05-18 ASSESSMENT — PATIENT HEALTH QUESTIONNAIRE - PHQ9
1. LITTLE INTEREST OR PLEASURE IN DOING THINGS: NOT AT ALL
SUM OF ALL RESPONSES TO PHQ9 QUESTIONS 1 AND 2: 0
2. FEELING DOWN, DEPRESSED, IRRITABLE, OR HOPELESS: NOT AT ALL

## 2025-05-18 ASSESSMENT — FIBROSIS 4 INDEX: FIB4 SCORE: 5.9

## 2025-05-18 NOTE — ED NOTES
Patient educated on importance of cervical spine immobilization. Did not place patient in c-collar per Trauma MD as patient is vomiting.

## 2025-05-18 NOTE — ASSESSMENT & PLAN NOTE
74-year-old male with history of alcoholic cirrhosis lives with diarrhea came with mechanical fall  Risk of delirium and hepatic encephalopathy  Lactulose and check ammonia  Nonpharmacological treatment and avoid benzo

## 2025-05-18 NOTE — ASSESSMENT & PLAN NOTE
Admission EKG QTc 616.  Avoid QT prolonging medications.  Continuous telemetry monitoring.   Monitor electrolytes.

## 2025-05-18 NOTE — ED NOTES
Trauma Green Transfer    Patient BIB [facility] ground unit #12 from [facility]. 74 y.o. male reportedly involved in multiple GLFs after syncopal events. + head strike, + LOC, and - thinners. Arrives GCS 14 on RA, multiple rounds of emesis en route brown in color.      Per OSH patient has a right acetabular fracture and hemorrhage into the pelvis.    Patient arrives w/ *no spinal immobilizations* in place.   Chief complaint of pain to hip.  Medications administered prior to arrival: 4mg morphine, 4mg zofran, 3L NS

## 2025-05-18 NOTE — H&P
CHIEF COMPLAINT: GLF.     HISTORY OF PRESENT ILLNESS: The patient is a 74 year-old White elderly man who supposedly sustained multiple GLFs earlier this morning. +Head strike. Possible syncope. He is amnestic to events however overall he is a very poor historian. He also had multiple bouts of dark emesis, his main c/o on arrival is nausea.    En route he was hypotensive and so far has received 3L of crystalloid. In the trauma bay his SBP was between  so a unit of whole blood was ordered. His imaging and labs are concerning for cirrhosis so platelets were ordered while the TEG is pending. IR was consulted for concerns of active extrav on CT.    TRIAGE CATEGORY: The patient was triaged as a Trauma Green Transfer Activation. The patient was initially evaluated at [facility] in [place], [place] where CT imaging demonstrated a right acetabular fracture with pelvic hematoma. He was transported to [facility] in [place], [place] for a Trauma Surgery trauma evaluation. An expeditious primary and secondary survey with required adjuncts was conducted. See Trauma Narrator for full details.    PAST MEDICAL HISTORY:  has no past medical history on file.  Unable to obtain- patient is unsure of medical issues    PAST SURGICAL HISTORY:  has no past surgical history on file.  Unable to obtain- patient is unsure of surgical issues    ALLERGIES: Allergies[1]  Unable to obtain- patient is unsure of his allergies    CURRENT MEDICATIONS:   Home Medications    **Home medications have not yet been reviewed for this encounter**       Audit from Redirected Encounters    **Home medications have not yet been reviewed for this encounter**       FAMILY HISTORY: family history is not on file.    SOCIAL HISTORY:      REVIEW OF SYSTEMS: Comprehensive review of systems is negative with the exception of the aforementioned HPI, PMH, and PSH bullets in accordance with CMS guidelines.    PHYSICAL EXAMINATION:   "    Vital Signs: BP 96/55   Pulse 84   Resp (!) 21   Ht 1.727 m (5' 8\")   Wt 77.1 kg (170 lb)   SpO2 94%   Physical Exam  Vitals and nursing note reviewed.   Constitutional:       Comments: Awake and responsive    HENT:      Head: Normocephalic.      Nose: Nose normal.      Mouth/Throat:      Mouth: Mucous membranes are dry.      Pharynx: Oropharynx is clear.   Eyes:      General: Scleral icterus present.   Cardiovascular:      Rate and Rhythm: Normal rate and regular rhythm.   Pulmonary:      Effort: Pulmonary effort is normal. No respiratory distress.   Abdominal:      Palpations: Abdomen is soft.      Tenderness: There is no abdominal tenderness.      Comments: Round abdomen   Large umbilical hernia with thin overlying skin but no incarcerated fat or bowel   Musculoskeletal:         General: Swelling present. No tenderness or deformity. Normal range of motion.      Cervical back: Normal range of motion. No rigidity or tenderness.   Skin:     General: Skin is warm and dry.   Neurological:      General: No focal deficit present.      GCS: GCS eye subscore is 4. GCS verbal subscore is 4. GCS motor subscore is 6.       LABORATORY VALUES:                      IMAGING:   DX-PELVIS-1 OR 2 VIEWS   Final Result      Right acetabular fracture.      CT-CSPINE WITHOUT PLUS RECONS    (Results Pending)   IR-CONSULT AND TREAT    (Results Pending)   US-TRAUMA VEIN SCREEN LOWER BILAT EXTREMITY    (Results Pending)       ASSESSMENT AND PLAN:   Evelio Pressley is a 74 y.o. gentleman presenting as a green transfer after a possible syncope/GLF. He is found to have a right acetabular fracture and active pelvic extrav. IR was called emergently. Ortho consulted. Admitting to SICU. Ongoing resuscitation for hemorrhagic shock complicated by liver dysfunction.  Trauma  Ground level fall, syncopal.  Trauma Green Transfer Activation from [facility] in [place], [place].  Gonzalez Cavazos MD. Trauma " Surgery.    Closed fracture of right acetabulum (HCC)  Right acetabular fracture with associated pelvic hemorrhage.  5/17 IR embolization.   Definitive plan pending.  Weight bearing status - Nonweightbearing RLE.  Sudhakar Pompa MD. Orthopedic Surgeon. [facility].    Hypovolemic shock (HCC)  Total resuscitation prior to arrival: 3L crystalloid.  Total resuscitation on admission: 1u whole blood.   Serial hemograms.   Transfuse 1u PRBC for Hgb <7.     Syncopal episodes  Witnessed syncopal episodes with EMS, hypotension.  EKG pending.  Ongoing resuscitation.    DISPOSITION: Trauma ICU.  Interval Trauma tertiary survey.    CRITICAL CARE TIME: My full attention was spent providing medically necessary critical care to the patient, exclusive of time spent on any procedures, for 50 minutes, with details documented in my note.  The patient has acute impairment of one or more vital organ systems and a high probability of imminent or life-threatening deterioration in condition. Provided high complexity decision making to assess, manipulate, and support vital system functions to treat vital organ system failure and/or to prevent further life-threatening deterioration of the patient's condition. Requires continued ICU and hospital admission.    Critical care interventions include: integration of multiple data points and associated complex medical decision making, management of blunt abdominal injury, traumatic shock resuscitation, management of critical electrolyte abnormalities, and management of thrombotic surveillance and risk mitigation.     ____________________________________     Gonzalez Cavazos M.D.    DD: 5/17/2025  8:49 PM  Injury Scoring Scale         [1]   Allergies  Allergen Reactions    Penicillins Anaphylaxis

## 2025-05-18 NOTE — CARE PLAN
The patient is Watcher - Medium risk of patient condition declining or worsening    Shift Goals  Clinical Goals: Hemodynammic stability Q4 H/H, Monitor cath site  Patient Goals: Rest  Family Goals: Updates      Problem: Knowledge Deficit - Standard  Goal: Patient and family/care givers will demonstrate understanding of plan of care, disease process/condition, diagnostic tests and medications  Outcome: Progressing     Problem: Pain - Standard  Goal: Alleviation of pain or a reduction in pain to the patient’s comfort goal  Outcome: Progressing     Problem: Skin Integrity  Goal: Skin integrity is maintained or improved  Outcome: Progressing     Problem: Fall Risk  Goal: Patient will remain free from falls  Outcome: Progressing

## 2025-05-18 NOTE — PROGRESS NOTES
Med Rec complete per daughter at bedside with med-list   Allergies reviewed  Antibiotics in the past 30 days:no  Anticoagulant in past 14 days:no  Pharmacy patient utilizes:[facility] in [place]

## 2025-05-18 NOTE — ASSESSMENT & PLAN NOTE
Admission TEG prolonged K time, low clot angle, and low MA.   - 1u platelets, 2u cryoprecipitate.   Post transfusion TEG with improved K time, clot angle, and MA. Inhibition remains elevated.   - 1u platelets transfused.

## 2025-05-18 NOTE — ASSESSMENT & PLAN NOTE
MELD 31.  Chronic condition treated with lactulose, spironolactone, lasix, midodrine.  Resume diuretics pending hemodynamic stability.  Lactulose resumed.

## 2025-05-18 NOTE — PROGRESS NOTES
No operative plans for today.  Okay for diet from orthopedic standpoint.  Recommend repeat pelvis x-rays tomorrow afternoon to evaluate for further displacement.  If displaces we will plan for fixation on Tuesday.  If stable we will likely treat nonoperatively due to minimally displaced nature and significant medical comorbidities.  For now remain nonweightbearing with motion as tolerated right lower extremity.        Sudhakar Pompa MD  Orthopedic Trauma Surgery

## 2025-05-18 NOTE — ASSESSMENT & PLAN NOTE
VTE prophylaxis initially contraindicated secondary to elevated bleeding risk.  5/17 Trauma surveillance venous duplex ultrasonography ordered.

## 2025-05-18 NOTE — PROGRESS NOTES
Pt arrived to ICU T907     Vitals:   HR: 85  BP: 136/64  RR: 18  SaO2: 96% on 3L NC  Wt: 85.6kg  Temp 96.5F temporal     Gtts currently infusin unit of whole blood  _____________________________________________________________     4 Eyes Skin Assessment Completed by Ratna RN and Ben RN.    Head WDL  Ears WDL  Nose WDL  Mouth WDL  Neck WDL  Breast/Chest WDL  Shoulder Blades WDL  Spine WDL  (R) Arm/Elbow/Hand Redness with wound - slow to babita  (L) Arm/Elbow/Hand Redness and Blanching elbow  Abdomen hernia  Groin WDL  Scrotum/Coccyx/Buttocks Redness and Blanching  (R) Leg Bruising  (L) Leg Bruising  (R) Heel/Foot/Toe Redness and Blanching  (L) Heel/Foot/Toe Redness and Blanching          Devices In Places ECG, Blood Pressure Cuff, Pulse Ox, Nicole, SCD's, and Nasal Cannula      Interventions In Place Gray Ear Foams, NC W/Ear Foams, Sacral Mepilex, TAP System, Pillows, Q2 Turns, Heels Loaded W/Pillows, and Pressure Redistribution Mattress    Possible Skin Injury Yes    Pictures Uploaded Into Epic Yes  Wound Consult Placed Yes  RN Wound Prevention Protocol Ordered Yes

## 2025-05-18 NOTE — ASSESSMENT & PLAN NOTE
Ground level fall, syncopal.  Trauma Green Transfer Activation from [facility] in [place], [place].  Gonzalez Cavazos MD. Trauma Surgery.

## 2025-05-18 NOTE — ASSESSMENT & PLAN NOTE
Needed IR embolization  Blood transfusion if needed less than 7  Hemoglobin every 8 hours  Patient needs blood transfusion    5/20/2025  Continuous cardiac monitoring.  Continue to check hemoglobin every 6 hours and transfuse for hemoglobin less than 7 or hemodynamic instability.    5/21/2025  Likely bleeding from colon mass noted on colonoscopy.  Discussed with Dr. Rios of gastroenterology.  CT angiogram ordered for evaluation of vasculature.    5/22/2025  Discussed with gastroenterology, BECKY Guerrero.  Pathology of sigmoid colon polyps consistent with tubular adenomas.  Right colon mass biopsy consistent with ischemic type injury and ulceration.  No invasive adenocarcinoma within the sampled tissue.  Descending colon biopsy also consistent with ischemic colitis with ulceration.  The patient's daughter reports that the patient and herself were working outside in hot weather before the episode the following morning.  He may have been dehydrated.  He is tolerating a full liquid diet and advancing to regular diet.

## 2025-05-18 NOTE — PROGRESS NOTES
Radiology Progress Note   Author: YUKI Madrigal Date & Time created: 5/18/2025  10:43 AM   Date of admission  5/17/2025  Note to reader: this note follows the APSO format rather than the historical SOAP format. Assessment and plan located at the top of the note for ease of use.    Chief Complaint  74 y.o. male admitted 5/17/2025 with GLF      HPI  74-year-old male with past medical history significant for liver cirrhosis transferred from outside facility 05/17/25 for CT finding of right acetabular fracture with pelvic hematoma concerning for extravasation after presenting there following a syncopal episode with possible head strike earlier that morning.  It is unclear how long patient was down and the patient himself is unsure.  Upon arrival, IR was consulted and patient underwent pelvic angiogram with empiric right internal iliac artery anterior division gelfoam embolization.      Interval History:   05/18/25 - patient received 1 unit RBC, 2 units platelet pheresis, and 1 unit cryoprecipitate overnight.  Patient currently receiving 1 unit PRBCs.  Patient lying in bed in good humor.  I reviewed all labs including WBC 14.9, hemoglobin 6.8, creatinine 2.2, GFR 31, AST 36, ALT 20, alk phos 48, INR 1.88, TEG platelet mapping AA percent inhibition 87.0, ADP percent inhibition 89.4.  Procedure sheath to right groin left in place last night following embolization due to elevated INR; currently oozing.     Assessment/Plan     Principal Problem:    Trauma  Active Problems:    Closed fracture of right acetabulum (HCC)    Hematemesis with nausea    Cirrhosis of liver with ascites (HCC)    Elevated serum creatinine    Hypovolemic shock (HCC)    Syncopal episodes    Thrombocytopenia (HCC)    Contraindication to deep vein thrombosis (DVT) prophylaxis    Benign prostatic hyperplasia with urinary frequency    Coagulopathy (HCC)    Prolonged Q-T interval on ECG    Encounter for geriatric assessment      Plan IR  - IR  procedure sheath can be removed at the discretion of the surgical team once coagulopathy has been resolved. Repeat TEG pending  - IR groin access site care following sheath removal: no lifting greater than 5 lbs and no baths/swimming/soaking in tub for 7-10 days. Shower OK. OK to change dressings/band aid as needed.   - Consider repeat CTA for any further concern for bleeding.  - IR signing off. Please re-consult for any further needs.     - Thank you for allowing Interventional Radiology team to participate in the patients care, if any additional care or requests are needed in the future please do not hesitate to call or place IR order           Review of Systems  Physical Exam   Review of Systems   Constitutional:  Positive for malaise/fatigue. Negative for chills and fever.   Respiratory:  Negative for shortness of breath.    Cardiovascular:  Negative for chest pain.   Gastrointestinal:  Negative for abdominal pain, nausea and vomiting.   Neurological:  Positive for weakness. Negative for dizziness.      Vitals:    05/18/25 1039   BP: 117/53   Pulse: 92   Resp: 18   Temp: 36.1 °C (97 °F)   SpO2: 96%        Physical Exam  Constitutional:       Appearance: He is ill-appearing.   Cardiovascular:      Rate and Rhythm: Normal rate.   Pulmonary:      Effort: Pulmonary effort is normal. No respiratory distress.   Abdominal:      General: There is no distension.   Skin:     General: Skin is warm and dry.      Comments: IR groin access site soft, non-tender, without ecchymosis; sheath remains in place, site oozing, and dressing saturated.   Neurological:      General: No focal deficit present.      Mental Status: He is alert and oriented to person, place, and time.   Psychiatric:         Mood and Affect: Mood normal.         Behavior: Behavior normal.             Labs    Recent Labs     05/17/25 2022 05/18/25  0217 05/18/25  0802   WBC 14.8* 14.9*  --    RBC 2.25* 2.15*  --    HEMOGLOBIN 8.4* 7.6* 6.8*   HEMATOCRIT 25.8*  "22.7*  --    .7* 105.6*  --    MCH 37.3* 35.3*  --    MCHC 32.6 33.5  --    RDW 60.5* 68.3*  --    PLATELETCT 69* 101*  --    MPV 11.4 9.8  --      Recent Labs     05/17/25 2023 05/18/25  0217   SODIUM 134* 136   POTASSIUM 4.9 5.4   CHLORIDE 105 108   CO2 13* 14*   GLUCOSE 182* 151*   BUN 27* 29*   CREATININE 2.24* 2.20*   CALCIUM 8.4* 8.6     Recent Labs     05/17/25 2023 05/18/25  0217   ALBUMIN 2.5* 2.9*   TBILIRUBIN 2.3* 3.3*   ALKPHOSPHAT 46 48   TOTPROTEIN 4.2* 5.0*   ALTSGPT 16 20   ASTSGOT 36 36   CREATININE 2.24* 2.20*     US-TRAUMA VEIN SCREEN LOWER BILAT EXTREMITY   Final Result      IR-EMBOLIZATION   Final Result      1.  Pelvic angiogram demonstrating no evidence of active extravasation, pseudoaneurysm or truncated vessel.   2.  Empiric Gelfoam embolization of the right internal iliac artery anterior division.   3.  Right common femoral arteriogram demonstrating appropriate access over the femoral head.   4.  Left internal iliac artery angiogram demonstrating no evidence of active extravasation, pseudoaneurysm or truncated vessel. Empiric embolization was not performed on the left due to lack of conventional angiographic findings which correlates with    recent CT findings demonstrating no evidence of injury in this region.         CT-CSPINE WITHOUT PLUS RECONS   Final Result      No acute fracture is identified.      DX-PELVIS-1 OR 2 VIEWS   Final Result      Right acetabular fracture.        INR   Date Value Ref Range Status   05/18/2025 1.88 (H) 0.87 - 1.13 Final     Comment:     INR - Non-therapeutic Reference Range: 0.87-1.13  INR - Therapeutic Reference Range: 2.0-4.0       No results found for: \"POCINR\"     Intake/Output Summary (Last 24 hours) at 5/18/2025 1043  Last data filed at 5/18/2025 0600  Gross per 24 hour   Intake 4188.79 ml   Output 400 ml   Net 3788.79 ml      I have personally reviewed the above labs and imaging      I have performed a physical exam and reviewed and updated " ROS and Plan today (5/18/2025).     53 minutes in directly providing and coordinating care and extensive data review.  No time overlap and excludes procedures.

## 2025-05-18 NOTE — ASSESSMENT & PLAN NOTE
Witnessed syncopal episodes with EMS, hypotension.  CT head from OSH without evidence of intracranial hemorrhage.  EKG SR rate 90, prolonged QTc. No ST segment changes.   Ongoing resuscitation.  Continuous telemetry monitoring.

## 2025-05-18 NOTE — ASSESSMENT & PLAN NOTE
Admission Hgb 8.4 from 10.7 at OSH.   Total resuscitation prior to arrival: 3L crystalloid.  Total resuscitation on admission: 1u whole blood.   Serial hemograms.   Transfuse 1u PRBC for Hgb <7.

## 2025-05-18 NOTE — PROGRESS NOTES
Trauma / Surgical Daily Progress Note    Date of Service  5/18/2025    Chief Complaint  74 y.o. male admitted 5/17/2025 with right acetabular fracture after GLF following syncopal episode.     POD # 1 IR embolization of right internal iliac artery     Interval Events  Tertiary exam completed.   IR embolization yesterday.   Hbg 6.8. Additional 1 unit PRBCs transfused this morning.   TEG demonstrates improved clotting ability, but still inhibited.     - Transfuse additional 1 unit of platelets.   - Q4 hour neuro checks.   - Continue ICU care.     Review of Systems  Review of Systems   Constitutional:  Positive for malaise/fatigue. Negative for chills and fever.   Respiratory:  Negative for cough and shortness of breath.    Cardiovascular:  Negative for chest pain.   Gastrointestinal:  Negative for abdominal pain, nausea and vomiting.        BM 5/17   Musculoskeletal:  Positive for myalgias.   Neurological:  Negative for dizziness, tingling, sensory change, focal weakness and headaches.        Vital Signs  Temp:  [35.7 °C (96.3 °F)-36.4 °C (97.6 °F)] 36.1 °C (97 °F)  Pulse:  [] 91  Resp:  [13-42] 20  BP: ()/(53-91) 153/61  SpO2:  [82 %-100 %] 98 %    Physical Exam  Physical Exam  Vitals and nursing note reviewed. Exam conducted with a chaperone present (wife at bedside).   Constitutional:       General: He is not in acute distress.     Appearance: He is ill-appearing.   HENT:      Head: Normocephalic and atraumatic.      Mouth/Throat:      Mouth: Mucous membranes are moist.   Eyes:      General: Scleral icterus present.   Cardiovascular:      Rate and Rhythm: Normal rate.   Pulmonary:      Effort: Pulmonary effort is normal. No respiratory distress.   Abdominal:      General: Abdomen is protuberant. There is distension.      Palpations: There is shifting dullness.      Tenderness: There is no abdominal tenderness.      Comments: Umbilical hernia, non-tender    Musculoskeletal:      Cervical back: Normal  range of motion and neck supple. No tenderness.      Comments: Moves all extremities    Neurological:      Mental Status: He is alert and oriented to person, place, and time.      GCS: GCS eye subscore is 4. GCS verbal subscore is 5. GCS motor subscore is 6.         Laboratory  Recent Results (from the past 24 hours)   CBC WITHOUT DIFFERENTIAL    Collection Time: 05/17/25  8:22 PM   Result Value Ref Range    WBC 14.8 (H) 4.8 - 10.8 K/uL    RBC 2.25 (L) 4.70 - 6.10 M/uL    Hemoglobin 8.4 (L) 14.0 - 18.0 g/dL    Hematocrit 25.8 (L) 42.0 - 52.0 %    .7 (H) 81.4 - 97.8 fL    MCH 37.3 (H) 27.0 - 33.0 pg    MCHC 32.6 32.3 - 36.5 g/dL    RDW 60.5 (H) 35.9 - 50.0 fL    Platelet Count 69 (L) 164 - 446 K/uL    MPV 11.4 9.0 - 12.9 fL   IMMATURE PLT FRACTION    Collection Time: 05/17/25  8:22 PM   Result Value Ref Range    Imm. Plt Fraction 5.5 0.6 - 13.1 %   CREATINE KINASE    Collection Time: 05/17/25  8:23 PM   Result Value Ref Range    CPK Total 99 0 - 154 U/L   COD - Adult (Type and Screen)    Collection Time: 05/17/25  8:23 PM   Result Value Ref Range    ABO Grouping Only A     Rh Grouping Only POS     Antibody Screen-Cod NEG    COMPONENT CELLULAR    Collection Time: 05/17/25  8:23 PM   Result Value Ref Range    Component R       R99                 Red Cells, LR       [id number]   issued       05/18/25   10:29      Product Type R99     Dispense Status issued     Unit Number (Barcoded) [id number]     Product Code (Barcoded) K0745A55     Blood Type (Barcoded) 0600    DIAGNOSTIC ALCOHOL    Collection Time: 05/17/25  8:23 PM   Result Value Ref Range    Diagnostic Alcohol <10.1 <10.1 mg/dL   Comp Metabolic Panel    Collection Time: 05/17/25  8:23 PM   Result Value Ref Range    Sodium 134 (L) 135 - 145 mmol/L    Potassium 4.9 3.6 - 5.5 mmol/L    Chloride 105 96 - 112 mmol/L    Co2 13 (L) 20 - 33 mmol/L    Anion Gap 16.0 7.0 - 16.0    Glucose 182 (H) 65 - 99 mg/dL    Bun 27 (H) 8 - 22 mg/dL    Creatinine 2.24 (H)  0.50 - 1.40 mg/dL    Calcium 8.4 (L) 8.5 - 10.5 mg/dL    Correct Calcium 9.6 8.5 - 10.5 mg/dL    AST(SGOT) 36 12 - 45 U/L    ALT(SGPT) 16 2 - 50 U/L    Alkaline Phosphatase 46 30 - 99 U/L    Total Bilirubin 2.3 (H) 0.1 - 1.5 mg/dL    Albumin 2.5 (L) 3.2 - 4.9 g/dL    Total Protein 4.2 (L) 6.0 - 8.2 g/dL    Globulin 1.7 (L) 1.9 - 3.5 g/dL    A-G Ratio 1.5 g/dL   PLATELET MAPPING WITH BASIC TEG    Collection Time: 05/17/25  8:23 PM   Result Value Ref Range    Reaction Time Initial-R 5.6 4.6 - 9.1 min    React Time Initial Hep 5.4 4.3 - 8.3 min    Clot Kinetics-K >5.0 (H) 0.8 - 2.1 min    Clot Angle-Angle 58.0 (L) 63.0 - 78.0 degrees    Maximum Clot Strength-MA <40.0 (L) 52.0 - 69.0 mm    TEG Functional Fibrinogen(MA) <4.0 (L) 15.0 - 32.0 mm    Lysis 30 minutes-LY30 0.0 0.0 - 2.6 %    % Inhibition ADP See Comment 0.0 - 17.0 %    % Inhibition AA See Comment 0.0 - 11.0 %    TEG Algorithm Link Algorithm    ESTIMATED GFR    Collection Time: 05/17/25  8:23 PM   Result Value Ref Range    GFR (CKD-EPI) 30 (A) >60 mL/min/1.73 m 2   Prothrombin Time    Collection Time: 05/17/25  8:30 PM   Result Value Ref Range    PT 32.5 (H) 12.0 - 14.6 sec    INR 3.14 (H) 0.87 - 1.13   APTT    Collection Time: 05/17/25  8:30 PM   Result Value Ref Range    APTT 43.0 (H) 24.7 - 36.0 sec   ABO Rh Confirm    Collection Time: 05/17/25  8:34 PM   Result Value Ref Range    ABO Rh Confirm A POS    PLATELETS REQUEST    Collection Time: 05/17/25  9:12 PM   Result Value Ref Range    Component P       P74                 Plts,PheresisIRR    [id number]   transfused   05/17/25   21:15      Product Type Platelets Pheresis IRR LR     Dispense Status transfused     Unit Number (Barcoded) [id number]     Product Code (Barcoded) V6351A12     Blood Type (Barcoded) 6200    CRYOPRECIPITATE    Collection Time: 05/17/25  9:50 PM   Result Value Ref Range    Component Ct       CTP                 Cryoprecipitate     [id number]   transfused   05/17/25   23:37       Product Type CTP     Dispense Status transfused     Unit Number (Barcoded) [id number]     Product Code (Barcoded) Y4228E25     Blood Type (Barcoded) 8400     Component Ct       CTP                 Cryoprecipitate     [id number]   issued       05/18/25   00:12      Product Type CTP     Dispense Status issued     Unit Number (Barcoded) [id number]     Product Code (Barcoded) K8988M97     Blood Type (Barcoded) 5100    PLATELET MAPPING WITH BASIC TEG    Collection Time: 05/17/25 11:25 PM   Result Value Ref Range    Reaction Time Initial-R 5.5 4.6 - 9.1 min    React Time Initial Hep 5.2 4.3 - 8.3 min    Clot Kinetics-K 2.3 (H) 0.8 - 2.1 min    Clot Angle-Angle 67.6 63.0 - 78.0 degrees    Maximum Clot Strength-MA 46.0 (L) 52.0 - 69.0 mm    TEG Functional Fibrinogen(MA) 7.7 (L) 15.0 - 32.0 mm    % Inhibition ADP See Comment 0.0 - 17.0 %    % Inhibition AA See Comment 0.0 - 11.0 %    TEG Algorithm Link Algorithm    MAGNESIUM    Collection Time: 05/17/25 11:25 PM   Result Value Ref Range    Magnesium 1.5 1.5 - 2.5 mg/dL   PHOSPHORUS    Collection Time: 05/17/25 11:25 PM   Result Value Ref Range    Phosphorus 4.9 (H) 2.5 - 4.5 mg/dL   POCT glucose device results    Collection Time: 05/17/25 11:26 PM   Result Value Ref Range    POC Glucose, Blood 155 (H) 65 - 99 mg/dL   PLATELETS REQUEST    Collection Time: 05/18/25 12:45 AM   Result Value Ref Range    Component P       P07                 Plts,Pheresis       [id number]   issued       05/18/25   01:03      Product Type Platelets  Pheresis LR     Dispense Status issued     Unit Number (Barcoded) [id number]     Product Code (Barcoded) Q2706Y44     Blood Type (Barcoded) 0600    LACTIC ACID    Collection Time: 05/18/25  1:00 AM   Result Value Ref Range    Lactic Acid 5.9 (HH) 0.5 - 2.0 mmol/L   PLATELET MAPPING WITH BASIC TEG    Collection Time: 05/18/25  2:17 AM   Result Value Ref Range    Reaction Time Initial-R 4.7 4.6 - 9.1 min    React Time Initial Hep 4.5  4.3 - 8.3 min    Clot Kinetics-K 1.8 0.8 - 2.1 min    Clot Angle-Angle 70.5 63.0 - 78.0 degrees    Maximum Clot Strength-MA 52.9 52.0 - 69.0 mm    TEG Functional Fibrinogen(MA) 13.6 (L) 15.0 - 32.0 mm    % Inhibition ADP 89.4 (H) 0.0 - 17.0 %    % Inhibition AA 87.0 (H) 0.0 - 11.0 %    TEG Algorithm Link Algorithm    CBC with Differential: Tomorrow AM    Collection Time: 05/18/25  2:17 AM   Result Value Ref Range    WBC 14.9 (H) 4.8 - 10.8 K/uL    RBC 2.15 (L) 4.70 - 6.10 M/uL    Hemoglobin 7.6 (L) 14.0 - 18.0 g/dL    Hematocrit 22.7 (L) 42.0 - 52.0 %    .6 (H) 81.4 - 97.8 fL    MCH 35.3 (H) 27.0 - 33.0 pg    MCHC 33.5 32.3 - 36.5 g/dL    RDW 68.3 (H) 35.9 - 50.0 fL    Platelet Count 101 (L) 164 - 446 K/uL    MPV 9.8 9.0 - 12.9 fL    Neutrophils-Polys 83.80 (H) 44.00 - 72.00 %    Lymphocytes 5.10 (L) 22.00 - 41.00 %    Monocytes 10.20 0.00 - 13.40 %    Eosinophils 0.00 0.00 - 6.90 %    Basophils 0.10 0.00 - 1.80 %    Immature Granulocytes 0.80 0.00 - 0.90 %    Nucleated RBC 0.00 0.00 - 0.20 /100 WBC    Neutrophils (Absolute) 12.52 (H) 1.82 - 7.42 K/uL    Lymphs (Absolute) 0.76 (L) 1.00 - 4.80 K/uL    Monos (Absolute) 1.52 (H) 0.00 - 0.85 K/uL    Eos (Absolute) 0.00 0.00 - 0.51 K/uL    Baso (Absolute) 0.01 0.00 - 0.12 K/uL    Immature Granulocytes (abs) 0.12 (H) 0.00 - 0.11 K/uL    NRBC (Absolute) 0.00 K/uL    Anisocytosis 1+     Macrocytosis 1+    Comp Metabolic Panel (CMP): Tomorrow AM    Collection Time: 05/18/25  2:17 AM   Result Value Ref Range    Sodium 136 135 - 145 mmol/L    Potassium 5.4 3.6 - 5.5 mmol/L    Chloride 108 96 - 112 mmol/L    Co2 14 (L) 20 - 33 mmol/L    Anion Gap 14.0 7.0 - 16.0    Glucose 151 (H) 65 - 99 mg/dL    Bun 29 (H) 8 - 22 mg/dL    Creatinine 2.20 (H) 0.50 - 1.40 mg/dL    Calcium 8.6 8.5 - 10.5 mg/dL    Correct Calcium 9.5 8.5 - 10.5 mg/dL    AST(SGOT) 36 12 - 45 U/L    ALT(SGPT) 20 2 - 50 U/L    Alkaline Phosphatase 48 30 - 99 U/L    Total Bilirubin 3.3 (H) 0.1 - 1.5 mg/dL     Albumin 2.9 (L) 3.2 - 4.9 g/dL    Total Protein 5.0 (L) 6.0 - 8.2 g/dL    Globulin 2.1 1.9 - 3.5 g/dL    A-G Ratio 1.4 g/dL   Prothrombin Time    Collection Time: 25  2:17 AM   Result Value Ref Range    PT 21.7 (H) 12.0 - 14.6 sec    INR 1.88 (H) 0.87 - 1.13   ESTIMATED GFR    Collection Time: 25  2:17 AM   Result Value Ref Range    GFR (CKD-EPI) 31 (A) >60 mL/min/1.73 m 2   PLATELET ESTIMATE    Collection Time: 25  2:17 AM   Result Value Ref Range    Plt Estimation Decreased    MORPHOLOGY    Collection Time: 25  2:17 AM   Result Value Ref Range    RBC Morphology Present     Poikilocytosis 1+    PERIPHERAL SMEAR REVIEW    Collection Time: 25  2:17 AM   Result Value Ref Range    Peripheral Smear Review see below    DIFFERENTIAL COMMENT    Collection Time: 25  2:17 AM   Result Value Ref Range    Comments-Diff see below    LACTIC ACID    Collection Time: 25  4:20 AM   Result Value Ref Range    Lactic Acid 3.8 (H) 0.5 - 2.0 mmol/L   POCT glucose device results    Collection Time: 25  5:48 AM   Result Value Ref Range    POC Glucose, Blood 133 (H) 65 - 99 mg/dL   Hemoglobin - Q6 hours x4    Collection Time: 25  8:02 AM   Result Value Ref Range    Hemoglobin 6.8 (L) 14.0 - 18.0 g/dL   BASIC TEG    Collection Time: 25  8:02 AM   Result Value Ref Range    Reaction Time Initial-R 3.1 (L) 4.6 - 9.1 min    React Time Initial Hep 3.2 (L) 4.3 - 8.3 min    Clot Kinetics-K 1.6 0.8 - 2.1 min    Clot Angle-Angle 72.0 63.0 - 78.0 degrees    Maximum Clot Strength-MA 53.7 52.0 - 69.0 mm    TEG Functional Fibrinogen(MA) 15.4 15.0 - 32.0 mm    TEG Algorithm Link Algorithm    EKG    Collection Time: 25  8:54 AM   Result Value Ref Range    Report       [facility]    Test Date:  2025  Pt Name:    FABIAN LIRIANO                 Department: TICU  MRN:        [id number]                      Room:       T3  Gender:     Male                         Technician: MARIETTA  :         [id number]                   Requested By:LINSEY M WEGENER  Order #:    [id number]                    Reading MD: Jaswinder Rubin MD    Measurements  Intervals                                Axis  Rate:       90                           P:          0  MI:         126                          QRS:        -24  QRSD:       101                          T:          0  QT:         420  QTc:        515    Interpretive Statements  Sinus rhythm  RSR' in V1 or V2, right VCD or RVH  Prolonged QT interval  No previous ECG available for comparison  Electronically Signed On 05- 08:54:15 PDT by Jaswinder Rubin MD     PLATELET MAPPING WITH BASIC TEG    Collection Time: 05/18/25 11:12 AM   Result Value Ref Range    Reaction Time Initial-R 3.6 (L) 4.6 - 9.1 min    React Time Initial Hep 3.9 (L) 4.3 - 8.3 min    Clot Kinetics-K 1.7 0.8 - 2.1 min    Clot Angle-Angle 71.1 63.0 - 78.0 degrees    Maximum Clot Strength-MA 53.6 52.0 - 69.0 mm    TEG Functional Fibrinogen(MA) 14.6 (L) 15.0 - 32.0 mm    % Inhibition ADP 87.9 (H) 0.0 - 17.0 %    % Inhibition AA 81.4 (H) 0.0 - 11.0 %    TEG Algorithm Link Algorithm    POCT glucose device results    Collection Time: 05/18/25 12:01 PM   Result Value Ref Range    POC Glucose, Blood 102 (H) 65 - 99 mg/dL   PLATELETS REQUEST    Collection Time: 05/18/25  1:00 PM   Result Value Ref Range    Component P       P07                 Plts,Pheresis       [id number]   issued       05/18/25   13:04      Product Type Platelets  Pheresis LR     Dispense Status issued     Unit Number (Barcoded) [id number]     Product Code (Barcoded) B3233O36     Blood Type (Barcoded) 6200        Fluids    Intake/Output Summary (Last 24 hours) at 5/18/2025 1447  Last data filed at 5/18/2025 1400  Gross per 24 hour   Intake 5216.53 ml   Output 650 ml   Net 4566.53 ml       Core Measures & Quality Metrics  Labs reviewed, Radiology images reviewed and Medications reviewed  Nicole catheter: Critically Ill - Requiring  Accurate Measurement of Urinary Output      DVT Prophylaxis: Contraindicated - High bleeding risk  DVT prophylaxis - mechanical: SCDs  Ulcer prophylaxis: Yes        RAP Score Total: 11    CAGE Results: not completed Blood Alcohol>0.08: no       Mental status adequate for full examination?: Yes    Spine cleared (radiologically and/or clinically): Yes    All current laboratory studies/radiology exams reviewed: Yes    Medications reconciliation has been reviewed: Yes, holding home lasix, potassium, and spironolactone     Completed Consultations:  Sudhakar Pompa MD, Orthopedic Surgery   Maddi Godinez MD, Geriatric Medicine      Pending Consultations:  None     Newly identified diagnoses, injuries and/or co-morbidities:  None     PDI Not completed      Assessment/Plan  * Trauma- (present on admission)  Assessment & Plan  Ground level fall, syncopal.  Trauma Green Transfer Activation from [facility] in [place], [place].  Gonzalez Cavazos MD. Trauma Surgery.    Coagulopathy (HCC)- (present on admission)  Assessment & Plan  Admission TEG prolonged K time, low clot angle, and low MA.   - 1u platelets, 2u cryoprecipitate.   Post transfusion TEG with PM.     Syncopal episodes- (present on admission)  Assessment & Plan  Witnessed syncopal episodes with EMS, hypotension.  CT head from OSH without evidence of intracranial hemorrhage.  EKG SR rate 90, prolonged QTc. No ST segment changes.   Ongoing resuscitation.  Continuous telemetry monitoring.    Hypovolemic shock (HCC)- (present on admission)  Assessment & Plan  Admission Hgb 8.4 from 10.7 at OSH.   Total resuscitation prior to arrival: 3L crystalloid.  Total resuscitation on admission: 1u whole blood.   Serial hemograms.   Transfuse 1u PRBC for Hgb <7.     Closed fracture of right acetabulum (HCC)- (present on admission)  Assessment & Plan  Right acetabular fracture with associated pelvic hemorrhage.  5/17 IR embolization.   Definitive operative  reduction and stabilization pending.  Weight bearing status - Nonweightbearing RLE.  Sudhakar Pompa MD. Orthopedic Surgeon. [facility].    Encounter for geriatric assessment- (present on admission)  Assessment & Plan  5/18 The patient is 65-74 years old and meets the following two or more criteria: 3 or more medications. A geriatrics consult is indicated  Maddi Godinez MD, Geriatric Hospitalist.    Prolonged Q-T interval on ECG- (present on admission)  Assessment & Plan  Admission EKG QTc 616.  Avoid QT prolonging medications.  Continuous telemetry monitoring.   Monitor electrolytes.    Contraindication to deep vein thrombosis (DVT) prophylaxis- (present on admission)  Assessment & Plan  VTE prophylaxis initially contraindicated secondary to elevated bleeding risk.  5/17 Trauma surveillance venous duplex ultrasonography ordered.    Thrombocytopenia (HCC)- (present on admission)  Assessment & Plan  Platelets 69.  1u platelets.   Trend.     Elevated serum creatinine- (present on admission)  Assessment & Plan  Cr 2.24, unknown baseline.  Fluid resuscitation.  Trend renal indices.     Cirrhosis of liver with ascites (HCC)- (present on admission)  Assessment & Plan  MELD 31.  Chronic condition treated with lactulose, spironolactone, lasix, midodrine.  Resume diuretics pending hemodynamic stability.  Resume lactulose pending medication reconciliation.    Hematemesis with nausea- (present on admission)  Assessment & Plan  Reported dark, coffee ground emesis.   History of liver cirrhosis.   Protonix BID.    Benign prostatic hyperplasia with urinary frequency- (present on admission)  Assessment & Plan  Chronic condition treated with tamsulosin and finasteride.  Resume pending medication reconciliation.      Discussed patient condition with Family, RN, Patient, trauma surgery, and ICU rounds. River Ren MD

## 2025-05-18 NOTE — ASSESSMENT & PLAN NOTE
Due to cirrhosis with INR more than 1.8  Consider vitamin K   INR daily    5/20/2025  Continue to monitor INR

## 2025-05-18 NOTE — PROGRESS NOTES
Patient back from IR at 2241. Hand off report received from IR staff.     Patient with HOB flat per order, 1+ pedal pulses bilaterally.

## 2025-05-18 NOTE — ASSESSMENT & PLAN NOTE
Ortho on board  Pain control    5/20/2025  Continue pain control with oxycodone and Skelaxin.  IV Dilaudid as needed for breakthrough pain  Continuous pulse oximetry monitoring to monitor for hypoxia and respiratory depression while receiving IV narcotic medications.    5/21/2025  Continue pain control.

## 2025-05-18 NOTE — ED PROVIDER NOTES
ED Provider Note    CHIEF COMPLAINT  Fall acetabular fracture hypotension active bleed    EXTERNAL RECORDS REVIEWED  External ED seen at [facility] for syncope and fall this morning at 730 A, has a history of cirrhosis found to have a right acetabular fracture with active extra I was hypotensive the outside facility hemoglobin went from 10.7-9.1 creatinine was 2 EKG did show some QTc prolongation    IVF were provided no blood was given     CT head without bleed or fracture     CT chest abd pelvis evidence of liver dysfunction cholelithiasis  Fracture of the right acetabulum associated hemorrhage in the pelvis and lower abdomen extends into the hip joint    HPI/ROS  LIMITATION TO HISTORY   Select: : None  OUTSIDE HISTORIAN(S):  EMS endorses no change in route has received a total of 3 L of fluid for hypotension    Evelio Donnell Pressley is a 74 y.o. male who presents to the emergency department after falling this morning.  The patient states that he just remembers he took out his dog this morning sometime around 730 and believe that is when he fell.  He is unsure of how long he was down for he thinks only 5 to 10 minutes however it is now 8:30 PM.  He was at the outside facility for a few hours but he must be down for an extended period of time.  He states he feeling nauseated he did vomit some coffee-ground like substance on the router at the outside facility.  He endorses mild nausea continued.  Patient unsure of his medications he does have an extensive list from the outside facility that shows he does have chronic cirrhosis is not on lactulose.    PAST MEDICAL HISTORY       SURGICAL HISTORY  patient denies any surgical history    FAMILY HISTORY  No family history on file.    SOCIAL HISTORY  Social History     Tobacco Use    Smoking status: Unknown    Smokeless tobacco: Not on file   Substance and Sexual Activity    Alcohol use: Not on file    Drug use: Not on file    Sexual activity: Not on file       CURRENT  "MEDICATIONS  Home Medications    **Home medications have not yet been reviewed for this encounter**       Audit from Redirected Encounters    **Home medications have not yet been reviewed for this encounter**         ALLERGIES  Allergies[1]    PHYSICAL EXAM  VITAL SIGNS: /55   Pulse 79   Resp (!) 21   Ht 1.727 m (5' 8\")   Wt 77.1 kg (170 lb)   SpO2 96% Comment: RA  BMI 25.85 kg/m²    Pulse OX: Pulse Oxygen level is normal on room air  Constitutional: Alert in no apparent distress.  HENT: Normocephalic, Atraumatic, MMM  Eyes: PERound. Conjunctiva normal, non-icteric.   Heart: Regular rate and rhythm, intact distal pulses   Lungs: Symmetrical movement, no resp distress   Abdomen: Non-tender, non-distended, normal bowel sounds umbilical hernia easily reduced mild fluid wave noted  EXT/Back tenderness over the left lateral pelvis  Skin: Warm, Dry, No erythema, No rash.   Neurologic: Alert and oriented, Grossly non-focal.       EKG/LABS  Labs Reviewed   CBC WITHOUT DIFFERENTIAL - Abnormal; Notable for the following components:       Result Value    WBC 14.8 (*)     RBC 2.25 (*)     Hemoglobin 8.4 (*)     Hematocrit 25.8 (*)     .7 (*)     MCH 37.3 (*)     RDW 60.5 (*)     Platelet Count 69 (*)     All other components within normal limits   COMP METABOLIC PANEL - Abnormal; Notable for the following components:    Sodium 134 (*)     Co2 13 (*)     Glucose 182 (*)     Bun 27 (*)     Creatinine 2.24 (*)     Calcium 8.4 (*)     Total Bilirubin 2.3 (*)     Albumin 2.5 (*)     Total Protein 4.2 (*)     Globulin 1.7 (*)     All other components within normal limits   PROTHROMBIN TIME - Abnormal; Notable for the following components:    PT 32.5 (*)     INR 3.14 (*)     All other components within normal limits   APTT - Abnormal; Notable for the following components:    APTT 43.0 (*)     All other components within normal limits   PLATELET MAPPING WITH BASIC TEG - Abnormal; Notable for the following components: "    Clot Kinetics-K >5.0 (*)     Clot Angle-Angle 58.0 (*)     Maximum Clot Strength-MA <40.0 (*)     TEG Functional Fibrinogen(MA) <4.0 (*)     All other components within normal limits   ESTIMATED GFR - Abnormal; Notable for the following components:    GFR (CKD-EPI) 30 (*)     All other components within normal limits   PLATELET MAPPING WITH BASIC TEG - Abnormal; Notable for the following components:    Clot Kinetics-K 2.3 (*)     Maximum Clot Strength-MA 46.0 (*)     TEG Functional Fibrinogen(MA) 7.7 (*)     All other components within normal limits   PHOSPHORUS - Abnormal; Notable for the following components:    Phosphorus 4.9 (*)     All other components within normal limits   LACTIC ACID - Abnormal; Notable for the following components:    Lactic Acid 5.9 (*)     All other components within normal limits   POCT GLUCOSE DEVICE RESULTS - Abnormal; Notable for the following components:    POC Glucose, Blood 155 (*)     All other components within normal limits   UN-XM'D RBC   COD (ADULT)   DIAGNOSTIC ALCOHOL   ABO RH CONFIRM   PLATELETS REQUEST   IMMATURE PLT FRACTION   CRYOPRECIPITATE   MAGNESIUM   PLATELETS REQUEST   CREATINE KINASE   COMPONENT CELLULAR   AMMONIA   LACTIC ACID   PLATELET MAPPING WITH BASIC TEG   CBC WITH DIFFERENTIAL   COMP METABOLIC PANEL   PROTHROMBIN TIME   POCT GLUCOSE   POCT GLUCOSE   POCT GLUCOSE   POCT GLUCOSE   RELEASE PLATELET PHERESIS   RELEASE CRYOPRECIPITATE   RELEASE CRYOPRECIPITATE   RELEASE PLATELET PHERESIS   TRANSFUSE PLATELET PHERESIS-NURSING COMMUNICATION   TRANSFUSE CRYOPRECIPITATE-NURSING COMMUNICATION   TRANSFUSE PLATELET PHERESIS-NURSING COMMUNICATION       I have independently interpreted this EKG    RADIOLOGY/PROCEDURES   I have independently interpreted the diagnostic imaging associated with this visit and am waiting the final reading from the radiologist.   My preliminary interpretation is as follows:       X-ray pelvis right acetabular fracture    Radiologist  interpretation:  DX-PELVIS-1 OR 2 VIEWS   Final Result      Right acetabular fracture.      CT-CSPINE WITHOUT PLUS RECONS    (Results Pending)   IR-CONSULT AND TREAT    (Results Pending)   US-TRAUMA VEIN SCREEN LOWER BILAT EXTREMITY    (Results Pending)       COURSE & MEDICAL DECISION MAKING    ASSESSMENT, COURSE AND PLAN  Care Narrative:     Patient is a transfer from [facility] for hypotension being down significant amount of time with a history of cirrhosis and active extravasation was hypotensive and received 3 L of fluid at the outside facility.  Patient will be treated with whole blood here in the emergency department for his history of cirrhosis and active bleeding as well as hypotension.   with trauma services met me at the bedside and he is discussing with interventional radiology for ligation for the active extra of due to the acetabular fracture.    He is going admit the patient to the ICU      DISPOSITION AND DISCUSSIONS  8:48 PM  Spoke w Dr Pompa with ortho who will evaluate the patient           I have discussed management of the patient with the following physicians and KAVON's:  alfredo Cavazos    Discussion of management with other QHP or appropriate source(s): blood bank     FINAL DIAGNOSIS  Ground-level fall  Acetabular fracture  Active extravasation  Cirrhosis  Abnormal coags     Electronically signed by: Taty Greer M.D., 5/17/2025 8:38 PM           [1]   Allergies  Allergen Reactions    Penicillins Anaphylaxis

## 2025-05-18 NOTE — ASSESSMENT & PLAN NOTE
Right acetabular fracture with associated pelvic hemorrhage.  5/17 IR embolization.   Definitive operative reduction and stabilization pending.  Weight bearing status - Nonweightbearing RLE.  Sudhakar Pompa MD. Orthopedic Surgeon. [facility].

## 2025-05-18 NOTE — ASSESSMENT & PLAN NOTE
5/18 The patient is 65-74 years old and meets the following two or more criteria: 3 or more medications. A geriatrics consult is indicated  Maddi Godinez MD, Geriatric Hospitalist.

## 2025-05-18 NOTE — OR SURGEON
Immediate Post- Operative Note        Findings: Pelvic fracture w/ active bleeding, hypotension.       Procedure(s): Pelvic angiogram w/ R internal iliac artery anterior division gelfoam embolization.       Estimated Blood Loss: Less than 15 ml        Complications: None            5/17/2025     1019 PM     Kike Fernandez M.D.

## 2025-05-18 NOTE — CONSULTS
74M hx cirrhosis and multiple GLF's   Seen at OSH noted right ACPHT acetabular fracture   Minimally displaced, fairly concentric dome  Hypotensive on arrival reportedly going to IR  Currently HD stable   Stable pelvis, does not need urgent fixation or stabilization  Medical optimization  NPO at mn for possible surgical intervention if indicated and cleared medically       Sudhakar Pompa MD  Orthopedic Trauma Surgery

## 2025-05-18 NOTE — PROGRESS NOTES
Pt presents to IR2.   Emergency Consent for this trauma patient who is somewhat confused.  Pt transferred to IR2 table in supine position.     Patient underwent a pelvic angiogram with gelfoam embolization of the right internal iliac artery, anterior division. by Dr. Fernandez. Procedure site was marked by MD and verified using imaging guidance.     Pt placed on monitor, prepped and draped in a sterile fashion. Vitals were taken every 5 minutes and remained stable during procedure (see doc flow sheet for results). CO2 waveform capnography was monitored and remained WNL throughout procedure.     Bilateral dorsalis pedis pulses +1.    Report called to DAVION Segundo. Pt transported by stretcher with RN to T907.     [employer] EmboCube, 2.5 mm x 50 mg  REF: [id number]  LOT: [id number]  EXP: 2027-11-27     Right Femoral Sheath remains in place- INR 3.14.

## 2025-05-18 NOTE — WOUND TEAM
Per bedside nurse, patient with MASD to sacrum. Bedside to utilize RN protocols. Consult will be completed, please reconsult if needed.

## 2025-05-18 NOTE — ASSESSMENT & PLAN NOTE
Mechanical fall  PT and OT  Pain control  Trauma board  Patient is at high risk for surgery, orthopedic did not recommend intervention

## 2025-05-18 NOTE — ASSESSMENT & PLAN NOTE
History of cirrhosis due to alcoholism  INR 1.8 and bilirubin more than 2  MELD 24  Check ammonia and started lactulose  No volume overload, start with IV fluid  Suspicious of GI bleeding, start with ceftriaxone and PPI  GI was consulted    5/20/2025  Monitor fluid status very closely.  Not enough ascites for paracentesis.    5/21/2025  Discontinue ceftriaxone due to low suspicion for esophageal variceal bleeding.  Continue to hold home furosemide and spironolactone.

## 2025-05-18 NOTE — CONSULTS
Geriatric Medicine Consultation  Date of Service 5/18/2025    Referring Physician  Gonzalez Cavazos M.D.    Consulting Physician  Maddi Godinez M.D.    Reason for Consultation  Fall    History of Presenting Illness    74-year-old male with history of osteoarthritis, alcohol abuse and cirrhosis who presented 5/17 with a ground-level fall.  Patient was evaluated by trauma and they found right acetabular fracture and active pelvic extravascular bleeding, IR was consulted and patient underwent embolization.  Ortho also was evaluated and no intervention recommended none weight bearing with motion as tolerated on the right lower extremity.    Daughter lives with the patient, denied any significant recurrent falls, patient has been medically stable, patient is independent and no significant history of dementia.      Review of Systems  Review of Systems   Constitutional:  Negative for chills, fever and weight loss.   HENT:  Negative for ear pain, hearing loss and tinnitus.    Eyes:  Negative for blurred vision, double vision and photophobia.   Respiratory:  Negative for cough and hemoptysis.    Cardiovascular:  Negative for chest pain, palpitations, orthopnea and claudication.   Gastrointestinal:  Negative for abdominal pain, constipation, diarrhea, nausea and vomiting.   Genitourinary:  Negative for dysuria, frequency and urgency.   Musculoskeletal:  Negative for myalgias and neck pain.   Skin:  Negative for rash.   Neurological:  Negative for dizziness, speech change and weakness.       Past Medical History  Alcoholism and cirrhosis    Surgical History  Reviewed    Family History  Reviewed    Social History       Living situation -   Marital status -   Primary caregiver -   Educational level -  Transportation -  [employer]                   No   Health care POA   No    Financial POA       No      Medications  Prior to Admission Medications   Prescriptions Last Dose Informant Patient Reported? Taking?   Ascorbic Acid  "(VITAMIN C PO) 5/16/2025 Morning Family Member Yes Yes   Sig: Take 1 Tablet by mouth every morning. \"Chewable\"   CALCIUM PO 5/16/2025 Morning Family Member Yes Yes   Sig: Take 2 Tablets by mouth every morning. \"Chewable\"   MILK THISTLE PO 5/16/2025 Evening Family Member Yes Yes   Sig: Take 1 Capsule by mouth 2 times a day.   Multiple Vitamins-Minerals (MULTI ADULT GUMMIES) Chew Tab 5/16/2025 Morning Family Member Yes Yes   Sig: Chew 2 Tablets every morning.   Naproxen Sodium (ALEVE) 220 MG Cap 5/17/2025 at 11:00 AM Family Member Yes Yes   Sig: Take 220 mg by mouth Once.   bismuth subsalicylate (PEPTO-BISMOL) 262 MG Chew Tab 5/17/2025 at 11:00 AM Family Member Yes Yes   Sig: Chew 262 mg Once.   folic acid (FOLVITE) 800 MCG tablet 5/16/2025 Morning Family Member Yes Yes   Sig: Take 800 mcg by mouth every morning.   furosemide (LASIX) 40 MG Tab 5/16/2025 Morning Family Member Yes Yes   Sig: Take 40 mg by mouth every morning.   potassium chloride (MICRO-K) 10 MEQ capsule 5/16/2025 Evening Family Member Yes No   Sig: Take 10 mEq by mouth 2 times a day.   spironolactone (ALDACTONE) 100 MG Tab 5/16/2025 Morning Family Member Yes No   Sig: Take 100 mg by mouth every morning.   thiamine (VITAMIN B-1) 100 MG Tab 5/16/2025 Morning Family Member Yes Yes   Sig: Take 100 mg by mouth every morning.      Facility-Administered Medications: None       Allergies  Allergies[1]    Geriatric Screening    ADL assistance              No   IADL assistance             No   Cognitive Impairment    No   Mood disorder                No   Polypharmacy                No   Vision impairment         Yes  Hearing impairment      Yes  Fall                                  Yes  Assistive device            Yes  Urinary incontinence    No   Nutrition issues            No   Food Insecurity            No   Pressure ulcer              No     Physical Exam  Temp:  [35.7 °C (96.3 °F)-36.4 °C (97.6 °F)] 36.3 °C (97.4 °F)  Pulse:  [] 92  Resp:  [13-42] " 19  BP: ()/(53-91) 120/54  SpO2:  [82 %-100 %] 97 %  Physical Exam  Constitutional:       General: He is not in acute distress.     Appearance: He is obese. He is not ill-appearing.   Eyes:      General: No scleral icterus.  Cardiovascular:      Rate and Rhythm: Normal rate.      Heart sounds: No murmur heard.  Pulmonary:      Effort: No respiratory distress.      Breath sounds: No wheezing.   Abdominal:      General: There is no distension.      Tenderness: There is no abdominal tenderness. There is no right CVA tenderness, left CVA tenderness or guarding.   Musculoskeletal:      Right lower leg: No edema.      Left lower leg: No edema.   Lymphadenopathy:      Cervical: No cervical adenopathy.   Skin:     Coloration: Skin is not jaundiced.      Findings: No bruising, lesion or rash.   Neurological:      General: No focal deficit present.      Mental Status: He is alert and oriented to person, place, and time. Mental status is at baseline.      Cranial Nerves: No cranial nerve deficit.      Motor: No weakness.      Gait: Gait normal.           CAM  Acute onset and fluctuating course   No   Inattention                                         No   Disorganized thinking                        No   Altered level of consciousness          No     MiniCOG  Word recall (0-3 points)  Clock draw (0-2 points  Total score (0-5 points)    PHQ-2    Over the past 2 weeks, how often have you been bothered by any of the following problems? Not at all Several days More than half the days Nearly every day   Little interest or pleasure in doing things? 0 1 2 3          Feeling down, depressed, or hopeless? 0  1  2  3   Total point score: ____ ____ + ____ + ____ + ____         Laboratory  Recent Labs     05/17/25 2022 05/18/25  0217 05/18/25  0802   WBC 14.8* 14.9*  --    RBC 2.25* 2.15*  --    HEMOGLOBIN 8.4* 7.6* 6.8*   HEMATOCRIT 25.8* 22.7*  --    .7* 105.6*  --    MCH 37.3* 35.3*  --    MCHC 32.6 33.5  --    RDW 60.5*  68.3*  --    PLATELETCT 69* 101*  --    MPV 11.4 9.8  --      Recent Labs     05/17/25 2023 05/18/25  0217   SODIUM 134* 136   POTASSIUM 4.9 5.4   CHLORIDE 105 108   CO2 13* 14*   GLUCOSE 182* 151*   BUN 27* 29*   CREATININE 2.24* 2.20*   CALCIUM 8.4* 8.6     Recent Labs     05/17/25 2030 05/18/25  0217   APTT 43.0*  --    INR 3.14* 1.88*               Imaging  US-TRAUMA VEIN SCREEN LOWER BILAT EXTREMITY   Final Result      IR-EMBOLIZATION   Final Result      1.  Pelvic angiogram demonstrating no evidence of active extravasation, pseudoaneurysm or truncated vessel.   2.  Empiric Gelfoam embolization of the right internal iliac artery anterior division.   3.  Right common femoral arteriogram demonstrating appropriate access over the femoral head.   4.  Left internal iliac artery angiogram demonstrating no evidence of active extravasation, pseudoaneurysm or truncated vessel. Empiric embolization was not performed on the left due to lack of conventional angiographic findings which correlates with    recent CT findings demonstrating no evidence of injury in this region.         CT-CSPINE WITHOUT PLUS RECONS   Final Result      No acute fracture is identified.      DX-PELVIS-1 OR 2 VIEWS   Final Result      Right acetabular fracture.          Assessment/Plan  Fall  Assessment & Plan  Mechanical fall  PT and OT  Pain control  Trauma board    Encounter for geriatric assessment- (present on admission)  Assessment & Plan  74-year-old male with history of alcoholic cirrhosis lives with diarrhea came with mechanical fall  Risk of delirium and hepatic encephalopathy  Lactulose and check ammonia  Nonpharmacological treatment and avoid benzo    Coagulopathy (HCC)- (present on admission)  Assessment & Plan  Due to cirrhosis with INR more than 1.8  Consider vitamin K   INR daily    Benign prostatic hyperplasia with urinary frequency- (present on admission)  Assessment & Plan  Bladder scan and Flomax if needed    Thrombocytopenia  (HCC)- (present on admission)  Assessment & Plan  Due to cirrhosis  101    Hypovolemic shock (HCC)- (present on admission)  Assessment & Plan  Needed IR embolization  Blood transfusion if needed less than 7  Hemoglobin every 8 hours  Patient needs blood transfusion    Cirrhosis of liver with ascites (HCC)- (present on admission)  Assessment & Plan  History of cirrhosis due to alcoholism  INR 1.8 and bilirubin more than 2  MELD   Check ammonia and started lactulose  No volume overload  Follow-up with GI for hepatocellular carcinoma screening    Closed fracture of right acetabulum (HCC)- (present on admission)  Assessment & Plan  Ortho on board  Pain control              [1]   Allergies  Allergen Reactions    Penicillins Anaphylaxis

## 2025-05-18 NOTE — CARE PLAN
The patient is Watcher - Medium risk of patient condition declining or worsening    Shift Goals  Clinical Goals: hemodymic stability  Patient Goals: rest, sleep  Family Goals: updates on POC    Progress made toward(s) clinical / shift goals:      Problem: Knowledge Deficit - Standard  Goal: Patient and family/care givers will demonstrate understanding of plan of care, disease process/condition, diagnostic tests and medications  Outcome: Progressing     Problem: Pain - Standard  Goal: Alleviation of pain or a reduction in pain to the patient’s comfort goal  Outcome: Progressing     Problem: Skin Integrity  Goal: Skin integrity is maintained or improved  Outcome: Progressing

## 2025-05-19 ENCOUNTER — APPOINTMENT (OUTPATIENT)
Dept: RADIOLOGY | Facility: MEDICAL CENTER | Age: 75
DRG: 270 | End: 2025-05-19
Attending: EMERGENCY MEDICAL TECHNICIAN, INTERMEDIATE
Payer: MEDICARE

## 2025-05-19 ENCOUNTER — APPOINTMENT (OUTPATIENT)
Dept: RADIOLOGY | Facility: MEDICAL CENTER | Age: 75
DRG: 270 | End: 2025-05-19
Attending: PHYSICIAN ASSISTANT
Payer: MEDICARE

## 2025-05-19 PROBLEM — Z01.89 ENCOUNTER FOR GERIATRIC ASSESSMENT: Status: RESOLVED | Noted: 2025-05-18 | Resolved: 2025-05-19

## 2025-05-19 PROBLEM — N18.9 ACUTE KIDNEY INJURY SUPERIMPOSED ON CHRONIC KIDNEY DISEASE (HCC): Status: ACTIVE | Noted: 2025-05-17

## 2025-05-19 PROBLEM — E87.20 LACTIC ACID ACIDOSIS: Status: ACTIVE | Noted: 2025-05-19

## 2025-05-19 PROBLEM — D50.0 ANEMIA DUE TO BLOOD LOSS: Status: ACTIVE | Noted: 2025-05-19

## 2025-05-19 PROBLEM — N17.9 ACUTE KIDNEY INJURY SUPERIMPOSED ON CHRONIC KIDNEY DISEASE (HCC): Status: ACTIVE | Noted: 2025-05-17

## 2025-05-19 PROBLEM — D72.829 LEUKOCYTOSIS: Status: ACTIVE | Noted: 2025-05-19

## 2025-05-19 PROBLEM — K92.2 GI BLEEDING: Status: ACTIVE | Noted: 2025-05-19

## 2025-05-19 LAB
ALBUMIN SERPL BCP-MCNC: 2.6 G/DL (ref 3.2–4.9)
ALBUMIN/GLOB SERPL: 1.2 G/DL
ALP SERPL-CCNC: 50 U/L (ref 30–99)
ALT SERPL-CCNC: 20 U/L (ref 2–50)
AMMONIA PLAS-SCNC: 31 UMOL/L (ref 11–45)
ANION GAP SERPL CALC-SCNC: 11 MMOL/L (ref 7–16)
APPEARANCE UR: CLEAR
AST SERPL-CCNC: 46 U/L (ref 12–45)
BACTERIA #/AREA URNS HPF: ABNORMAL /HPF
BASOPHILS # BLD AUTO: 0.1 % (ref 0–1.8)
BASOPHILS # BLD: 0.02 K/UL (ref 0–0.12)
BILIRUB SERPL-MCNC: 3 MG/DL (ref 0.1–1.5)
BILIRUB UR QL STRIP.AUTO: NEGATIVE
BUN SERPL-MCNC: 38 MG/DL (ref 8–22)
CALCIUM ALBUM COR SERPL-MCNC: 9.3 MG/DL (ref 8.5–10.5)
CALCIUM SERPL-MCNC: 8.2 MG/DL (ref 8.5–10.5)
CASTS URNS QL MICRO: ABNORMAL /LPF (ref 0–2)
CHLORIDE SERPL-SCNC: 109 MMOL/L (ref 96–112)
CO2 SERPL-SCNC: 16 MMOL/L (ref 20–33)
COLOR UR: ABNORMAL
CREAT SERPL-MCNC: 1.87 MG/DL (ref 0.5–1.4)
EOSINOPHIL # BLD AUTO: 0.01 K/UL (ref 0–0.51)
EOSINOPHIL NFR BLD: 0.1 % (ref 0–6.9)
EPITHELIAL CELLS 1715: ABNORMAL /HPF (ref 0–5)
ERYTHROCYTE [DISTWIDTH] IN BLOOD BY AUTOMATED COUNT: 77.9 FL (ref 35.9–50)
GFR SERPLBLD CREATININE-BSD FMLA CKD-EPI: 37 ML/MIN/1.73 M 2
GLOBULIN SER CALC-MCNC: 2.1 G/DL (ref 1.9–3.5)
GLUCOSE BLD STRIP.AUTO-MCNC: 101 MG/DL (ref 65–99)
GLUCOSE BLD STRIP.AUTO-MCNC: 123 MG/DL (ref 65–99)
GLUCOSE BLD STRIP.AUTO-MCNC: 126 MG/DL (ref 65–99)
GLUCOSE BLD STRIP.AUTO-MCNC: 132 MG/DL (ref 65–99)
GLUCOSE SERPL-MCNC: 102 MG/DL (ref 65–99)
GLUCOSE UR STRIP.AUTO-MCNC: NEGATIVE MG/DL
HCT VFR BLD AUTO: 20.6 % (ref 42–52)
HGB BLD-MCNC: 7 G/DL (ref 14–18)
HGB BLD-MCNC: 8.5 G/DL (ref 14–18)
IMM GRANULOCYTES # BLD AUTO: 0.21 K/UL (ref 0–0.11)
IMM GRANULOCYTES NFR BLD AUTO: 1.1 % (ref 0–0.9)
INR PPP: 1.77 (ref 0.87–1.13)
KETONES UR STRIP.AUTO-MCNC: NEGATIVE MG/DL
LACTATE SERPL-SCNC: 2.5 MMOL/L (ref 0.5–2)
LACTATE SERPL-SCNC: 3.4 MMOL/L (ref 0.5–2)
LACTATE SERPL-SCNC: 3.5 MMOL/L (ref 0.5–2)
LEUKOCYTE ESTERASE UR QL STRIP.AUTO: ABNORMAL
LYMPHOCYTES # BLD AUTO: 0.81 K/UL (ref 1–4.8)
LYMPHOCYTES NFR BLD: 4.3 % (ref 22–41)
MAGNESIUM SERPL-MCNC: 2.1 MG/DL (ref 1.5–2.5)
MCH RBC QN AUTO: 34.1 PG (ref 27–33)
MCHC RBC AUTO-ENTMCNC: 34 G/DL (ref 32.3–36.5)
MCV RBC AUTO: 100.5 FL (ref 81.4–97.8)
MICRO URNS: ABNORMAL
MONOCYTES # BLD AUTO: 1.42 K/UL (ref 0–0.85)
MONOCYTES NFR BLD AUTO: 7.6 % (ref 0–13.4)
NEUTROPHILS # BLD AUTO: 16.2 K/UL (ref 1.82–7.42)
NEUTROPHILS NFR BLD: 86.8 % (ref 44–72)
NITRITE UR QL STRIP.AUTO: NEGATIVE
NRBC # BLD AUTO: 0 K/UL
NRBC BLD-RTO: 0 /100 WBC (ref 0–0.2)
PH UR STRIP.AUTO: 5 [PH] (ref 5–8)
PHOSPHATE SERPL-MCNC: 3.2 MG/DL (ref 2.5–4.5)
PLATELET # BLD AUTO: 102 K/UL (ref 164–446)
PLATELETS.RETICULATED NFR BLD AUTO: 2 % (ref 0.6–13.1)
PMV BLD AUTO: 9.9 FL (ref 9–12.9)
POTASSIUM SERPL-SCNC: 4.4 MMOL/L (ref 3.6–5.5)
PROT SERPL-MCNC: 4.7 G/DL (ref 6–8.2)
PROT UR QL STRIP: NEGATIVE MG/DL
PROTHROMBIN TIME: 20.7 SEC (ref 12–14.6)
RBC # BLD AUTO: 2.05 M/UL (ref 4.7–6.1)
RBC # URNS HPF: ABNORMAL /HPF (ref 0–2)
RBC UR QL AUTO: NEGATIVE
SODIUM SERPL-SCNC: 136 MMOL/L (ref 135–145)
SP GR UR STRIP.AUTO: 1.03
UROBILINOGEN UR STRIP.AUTO-MCNC: 1 EU/DL
WBC # BLD AUTO: 18.7 K/UL (ref 4.8–10.8)
WBC #/AREA URNS HPF: ABNORMAL /HPF

## 2025-05-19 PROCEDURE — 700102 HCHG RX REV CODE 250 W/ 637 OVERRIDE(OP)

## 2025-05-19 PROCEDURE — 83605 ASSAY OF LACTIC ACID: CPT | Mod: 91

## 2025-05-19 PROCEDURE — 71045 X-RAY EXAM CHEST 1 VIEW: CPT

## 2025-05-19 PROCEDURE — 85055 RETICULATED PLATELET ASSAY: CPT

## 2025-05-19 PROCEDURE — 85018 HEMOGLOBIN: CPT

## 2025-05-19 PROCEDURE — 99232 SBSQ HOSP IP/OBS MODERATE 35: CPT | Performed by: SURGERY

## 2025-05-19 PROCEDURE — 700102 HCHG RX REV CODE 250 W/ 637 OVERRIDE(OP): Performed by: INTERNAL MEDICINE

## 2025-05-19 PROCEDURE — 700101 HCHG RX REV CODE 250

## 2025-05-19 PROCEDURE — A9270 NON-COVERED ITEM OR SERVICE: HCPCS | Performed by: PHYSICIAN ASSISTANT

## 2025-05-19 PROCEDURE — 83735 ASSAY OF MAGNESIUM: CPT

## 2025-05-19 PROCEDURE — A9270 NON-COVERED ITEM OR SERVICE: HCPCS

## 2025-05-19 PROCEDURE — 80053 COMPREHEN METABOLIC PANEL: CPT

## 2025-05-19 PROCEDURE — 700102 HCHG RX REV CODE 250 W/ 637 OVERRIDE(OP): Performed by: PHYSICIAN ASSISTANT

## 2025-05-19 PROCEDURE — 85025 COMPLETE CBC W/AUTO DIFF WBC: CPT

## 2025-05-19 PROCEDURE — 99233 SBSQ HOSP IP/OBS HIGH 50: CPT | Performed by: PHYSICIAN ASSISTANT

## 2025-05-19 PROCEDURE — 700105 HCHG RX REV CODE 258

## 2025-05-19 PROCEDURE — 99233 SBSQ HOSP IP/OBS HIGH 50: CPT | Performed by: INTERNAL MEDICINE

## 2025-05-19 PROCEDURE — 700111 HCHG RX REV CODE 636 W/ 250 OVERRIDE (IP): Performed by: SURGERY

## 2025-05-19 PROCEDURE — 700111 HCHG RX REV CODE 636 W/ 250 OVERRIDE (IP)

## 2025-05-19 PROCEDURE — 87150 DNA/RNA AMPLIFIED PROBE: CPT | Mod: 91

## 2025-05-19 PROCEDURE — 99222 1ST HOSP IP/OBS MODERATE 55: CPT | Performed by: NURSE PRACTITIONER

## 2025-05-19 PROCEDURE — 82962 GLUCOSE BLOOD TEST: CPT | Mod: 91

## 2025-05-19 PROCEDURE — 700105 HCHG RX REV CODE 258: Performed by: SURGERY

## 2025-05-19 PROCEDURE — 84145 PROCALCITONIN (PCT): CPT

## 2025-05-19 PROCEDURE — 72190 X-RAY EXAM OF PELVIS: CPT

## 2025-05-19 PROCEDURE — 82140 ASSAY OF AMMONIA: CPT

## 2025-05-19 PROCEDURE — 84100 ASSAY OF PHOSPHORUS: CPT

## 2025-05-19 PROCEDURE — 700105 HCHG RX REV CODE 258: Performed by: INTERNAL MEDICINE

## 2025-05-19 PROCEDURE — A9270 NON-COVERED ITEM OR SERVICE: HCPCS | Performed by: INTERNAL MEDICINE

## 2025-05-19 PROCEDURE — 85610 PROTHROMBIN TIME: CPT

## 2025-05-19 PROCEDURE — 87040 BLOOD CULTURE FOR BACTERIA: CPT

## 2025-05-19 PROCEDURE — 81001 URINALYSIS AUTO W/SCOPE: CPT

## 2025-05-19 PROCEDURE — 770020 HCHG ROOM/CARE - TELE (206)

## 2025-05-19 PROCEDURE — 87077 CULTURE AEROBIC IDENTIFY: CPT

## 2025-05-19 PROCEDURE — 36415 COLL VENOUS BLD VENIPUNCTURE: CPT

## 2025-05-19 RX ORDER — FUROSEMIDE 20 MG/1
20 TABLET ORAL EVERY MORNING
Status: DISCONTINUED | OUTPATIENT
Start: 2025-05-19 | End: 2025-05-23 | Stop reason: HOSPADM

## 2025-05-19 RX ORDER — POTASSIUM CHLORIDE 750 MG/1
10 CAPSULE, EXTENDED RELEASE ORAL ONCE
Status: DISCONTINUED | OUTPATIENT
Start: 2025-05-19 | End: 2025-05-19

## 2025-05-19 RX ORDER — SPIRONOLACTONE 25 MG/1
50 TABLET ORAL EVERY MORNING
Status: DISCONTINUED | OUTPATIENT
Start: 2025-05-19 | End: 2025-05-21

## 2025-05-19 RX ORDER — PHYTONADIONE 5 MG/1
5 TABLET ORAL ONCE
Status: COMPLETED | OUTPATIENT
Start: 2025-05-19 | End: 2025-05-19

## 2025-05-19 RX ORDER — SODIUM CHLORIDE 9 MG/ML
INJECTION, SOLUTION INTRAVENOUS CONTINUOUS
Status: DISCONTINUED | OUTPATIENT
Start: 2025-05-19 | End: 2025-05-23 | Stop reason: HOSPADM

## 2025-05-19 RX ADMIN — GABAPENTIN 100 MG: 100 CAPSULE ORAL at 21:51

## 2025-05-19 RX ADMIN — LACTULOSE 30 ML: 10 SOLUTION ORAL at 06:10

## 2025-05-19 RX ADMIN — SODIUM CHLORIDE: 9 INJECTION, SOLUTION INTRAVENOUS at 18:21

## 2025-05-19 RX ADMIN — PANTOPRAZOLE SODIUM 40 MG: 40 INJECTION, POWDER, FOR SOLUTION INTRAVENOUS at 06:10

## 2025-05-19 RX ADMIN — Medication 1 EACH: at 18:23

## 2025-05-19 RX ADMIN — SODIUM CHLORIDE, POTASSIUM CHLORIDE, SODIUM LACTATE AND CALCIUM CHLORIDE: 600; 310; 30; 20 INJECTION, SOLUTION INTRAVENOUS at 02:18

## 2025-05-19 RX ADMIN — METAXALONE 400 MG: 800 TABLET ORAL at 06:09

## 2025-05-19 RX ADMIN — Medication 1 EACH: at 12:43

## 2025-05-19 RX ADMIN — PANTOPRAZOLE SODIUM 40 MG: 40 INJECTION, POWDER, FOR SOLUTION INTRAVENOUS at 18:22

## 2025-05-19 RX ADMIN — METAXALONE 400 MG: 800 TABLET ORAL at 18:22

## 2025-05-19 RX ADMIN — CEFTRIAXONE SODIUM 2000 MG: 10 INJECTION, POWDER, FOR SOLUTION INTRAVENOUS at 12:43

## 2025-05-19 RX ADMIN — Medication 1 EACH: at 06:09

## 2025-05-19 RX ADMIN — PHYTONADIONE 5 MG: 5 TABLET ORAL at 19:55

## 2025-05-19 RX ADMIN — SPIRONOLACTONE 50 MG: 50 TABLET ORAL at 12:42

## 2025-05-19 RX ADMIN — GABAPENTIN 100 MG: 100 CAPSULE ORAL at 06:09

## 2025-05-19 RX ADMIN — FUROSEMIDE 20 MG: 20 TABLET ORAL at 12:42

## 2025-05-19 ASSESSMENT — ENCOUNTER SYMPTOMS
COUGH: 0
ROS GI COMMENTS: BM 5/19
MYALGIAS: 1
DOUBLE VISION: 0
SHORTNESS OF BREATH: 0
CONSTIPATION: 0
PALPITATIONS: 0
WEIGHT LOSS: 1
BLURRED VISION: 0
ABDOMINAL PAIN: 1
DIARRHEA: 0
CLAUDICATION: 0
SENSORY CHANGE: 0
PHOTOPHOBIA: 0
MYALGIAS: 0
FOCAL WEAKNESS: 0
TINGLING: 0
DIZZINESS: 0
ABDOMINAL PAIN: 0
FEVER: 0
NAUSEA: 0
HEADACHES: 0
VOMITING: 0
WEAKNESS: 0
HEMOPTYSIS: 0
SPEECH CHANGE: 0
CHILLS: 0
ORTHOPNEA: 0
BLOOD IN STOOL: 1
NECK PAIN: 0

## 2025-05-19 ASSESSMENT — PAIN DESCRIPTION - PAIN TYPE
TYPE: ACUTE PAIN

## 2025-05-19 ASSESSMENT — FIBROSIS 4 INDEX: FIB4 SCORE: 7.46

## 2025-05-19 NOTE — THERAPY
Physical Therapy Contact Note    Patient Name: Evelio Pressley  Age:  74 y.o., Sex:  male  Medical Record #: [id number]  Today's Date: 5/19/2025 05/19/25 1500   Interdisciplinary Plan of Care Collaboration   Collaboration Comments PT aashish held, as patient has strict bedrest orders at this time

## 2025-05-19 NOTE — ASSESSMENT & PLAN NOTE
WBC increased to 18.7. Afebrile.   - UA, blood cultures x 2, repeat lactate, and procalcitonin pending per geriatrics.   5/18 Initiate prophylaxis for spontaneous bacterial peritonitis with Rocephin.   Monitor.

## 2025-05-19 NOTE — PROGRESS NOTES
Date of Service  5/19/2025    Chief Complaint  74 y.o. male admitted 5/17/2025 with right acetabular fracture after GLF following syncopal episode.     POD # 2 IR embolization of right internal iliac artery     Interval Events  Hgb 7 this morning.   Infectious workup per geriatrics.   Patient more confused and somnolent today.   No increased supplemental oxygen requirements.   Afebrile.   Repeat pelvic xray per orthopedic team today.   Patient is not likely a candidate for surgery.     - Initiate prophylaxis for spontaneous bacterial peritonitis.   - Trend hgb. Transfuse 1 unit PRBC's for hemoglobin less than 7.  - Q4 hour neuro checks.   - Medically cleared for transfer to [facility].   - Appreciate transfer of care to medicine service.     Review of Systems  Review of Systems   Constitutional:  Positive for malaise/fatigue. Negative for chills and fever.   Respiratory:  Negative for cough and shortness of breath.    Cardiovascular:  Negative for chest pain.   Gastrointestinal:  Negative for abdominal pain, nausea and vomiting.        BM 5/19   Musculoskeletal:  Positive for myalgias.   Neurological:  Negative for dizziness, tingling, sensory change, focal weakness and headaches.        Vital Signs  Temp:  [36.2 °C (97.2 °F)] 36.2 °C (97.2 °F)  Pulse:  [66-95] 91  Resp:  [15-39] 28  BP: ()/(51-79) 128/79  SpO2:  [90 %-99 %] 96 %    Physical Exam  Physical Exam  Vitals and nursing note reviewed. Exam conducted with a chaperone present (wife at bedside).   Constitutional:       General: He is sleeping. He is not in acute distress.     Appearance: He is ill-appearing.   HENT:      Head: Normocephalic and atraumatic.      Mouth/Throat:      Mouth: Mucous membranes are moist.   Eyes:      General: Scleral icterus present.   Cardiovascular:      Rate and Rhythm: Normal rate.   Pulmonary:      Effort: Pulmonary effort is normal. No respiratory distress.   Abdominal:      General: Abdomen is protuberant. There is  distension.      Palpations: There is shifting dullness.      Tenderness: There is no abdominal tenderness.      Comments: Umbilical hernia, non-tender    Musculoskeletal:      Cervical back: Normal range of motion and neck supple. No tenderness.      Comments: Moves all extremities    Neurological:      Mental Status: He is easily aroused.      GCS: GCS eye subscore is 4. GCS verbal subscore is 4. GCS motor subscore is 6.         Laboratory  Recent Results (from the past 24 hours)   POCT glucose device results    Collection Time: 05/18/25  5:59 PM   Result Value Ref Range    POC Glucose, Blood 110 (H) 65 - 99 mg/dL   Hemoglobin - Q6 hours x4    Collection Time: 05/18/25  8:52 PM   Result Value Ref Range    Hemoglobin 6.6 (L) 14.0 - 18.0 g/dL   POCT glucose device results    Collection Time: 05/19/25 12:13 AM   Result Value Ref Range    POC Glucose, Blood 101 (H) 65 - 99 mg/dL   CBC with Differential: Tomorrow AM    Collection Time: 05/19/25  3:40 AM   Result Value Ref Range    WBC 18.7 (H) 4.8 - 10.8 K/uL    RBC 2.05 (L) 4.70 - 6.10 M/uL    Hemoglobin 7.0 (L) 14.0 - 18.0 g/dL    Hematocrit 20.6 (L) 42.0 - 52.0 %    .5 (H) 81.4 - 97.8 fL    MCH 34.1 (H) 27.0 - 33.0 pg    MCHC 34.0 32.3 - 36.5 g/dL    RDW 77.9 (H) 35.9 - 50.0 fL    Platelet Count 102 (L) 164 - 446 K/uL    MPV 9.9 9.0 - 12.9 fL    Neutrophils-Polys 86.80 (H) 44.00 - 72.00 %    Lymphocytes 4.30 (L) 22.00 - 41.00 %    Monocytes 7.60 0.00 - 13.40 %    Eosinophils 0.10 0.00 - 6.90 %    Basophils 0.10 0.00 - 1.80 %    Immature Granulocytes 1.10 (H) 0.00 - 0.90 %    Nucleated RBC 0.00 0.00 - 0.20 /100 WBC    Neutrophils (Absolute) 16.20 (H) 1.82 - 7.42 K/uL    Lymphs (Absolute) 0.81 (L) 1.00 - 4.80 K/uL    Monos (Absolute) 1.42 (H) 0.00 - 0.85 K/uL    Eos (Absolute) 0.01 0.00 - 0.51 K/uL    Baso (Absolute) 0.02 0.00 - 0.12 K/uL    Immature Granulocytes (abs) 0.21 (H) 0.00 - 0.11 K/uL    NRBC (Absolute) 0.00 K/uL   Comp Metabolic Panel (CMP): Tomorrow  AM    Collection Time: 05/19/25  3:40 AM   Result Value Ref Range    Sodium 136 135 - 145 mmol/L    Potassium 4.4 3.6 - 5.5 mmol/L    Chloride 109 96 - 112 mmol/L    Co2 16 (L) 20 - 33 mmol/L    Anion Gap 11.0 7.0 - 16.0    Glucose 102 (H) 65 - 99 mg/dL    Bun 38 (H) 8 - 22 mg/dL    Creatinine 1.87 (H) 0.50 - 1.40 mg/dL    Calcium 8.2 (L) 8.5 - 10.5 mg/dL    Correct Calcium 9.3 8.5 - 10.5 mg/dL    AST(SGOT) 46 (H) 12 - 45 U/L    ALT(SGPT) 20 2 - 50 U/L    Alkaline Phosphatase 50 30 - 99 U/L    Total Bilirubin 3.0 (H) 0.1 - 1.5 mg/dL    Albumin 2.6 (L) 3.2 - 4.9 g/dL    Total Protein 4.7 (L) 6.0 - 8.2 g/dL    Globulin 2.1 1.9 - 3.5 g/dL    A-G Ratio 1.2 g/dL   Magnesium: Every Monday and Thursday AM    Collection Time: 05/19/25  3:40 AM   Result Value Ref Range    Magnesium 2.1 1.5 - 2.5 mg/dL   Phosphorus: Every Monday and Thursday AM    Collection Time: 05/19/25  3:40 AM   Result Value Ref Range    Phosphorus 3.2 2.5 - 4.5 mg/dL   IMMATURE PLT FRACTION    Collection Time: 05/19/25  3:40 AM   Result Value Ref Range    Imm. Plt Fraction 2.0 0.6 - 13.1 %   ESTIMATED GFR    Collection Time: 05/19/25  3:40 AM   Result Value Ref Range    GFR (CKD-EPI) 37 (A) >60 mL/min/1.73 m 2   POCT glucose device results    Collection Time: 05/19/25  6:08 AM   Result Value Ref Range    POC Glucose, Blood 126 (H) 65 - 99 mg/dL   LACTIC ACID    Collection Time: 05/19/25 12:32 PM   Result Value Ref Range    Lactic Acid 3.5 (H) 0.5 - 2.0 mmol/L   Prothrombin time (INR)    Collection Time: 05/19/25 12:32 PM   Result Value Ref Range    PT 20.7 (H) 12.0 - 14.6 sec    INR 1.77 (H) 0.87 - 1.13   Blood Culture    Collection Time: 05/19/25 12:32 PM    Specimen: Peripheral; Blood   Result Value Ref Range    Significant Indicator NEG     Source BLD     Site Peripheral     Culture Result       No Growth  Note: Blood cultures are incubated for 5 days and  are monitored continuously.Positive blood cultures  are called to the RN and reported as soon  as  they are identified.     Urinalysis    Collection Time: 05/19/25 12:32 PM    Specimen: Urine, Nicole Cath   Result Value Ref Range    Color Dark Yellow     Character Clear     Specific Gravity 1.031 <1.035    Ph 5.0 5.0 - 8.0    Glucose Negative Negative mg/dL    Ketones Negative Negative mg/dL    Protein Negative Negative mg/dL    Bilirubin Negative Negative    Urobilinogen, Urine 1.0 <=1.0 EU/dL    Nitrite Negative Negative    Leukocyte Esterase Small (A) Negative    Occult Blood Negative Negative    Micro Urine Req Microscopic    AMMONIA    Collection Time: 05/19/25 12:32 PM   Result Value Ref Range    Ammonia 31 11 - 45 umol/L   URINE MICROSCOPIC (W/UA)    Collection Time: 05/19/25 12:32 PM   Result Value Ref Range    WBC 11-20 (A) /hpf    RBC 3-5 (A) 0 - 2 /hpf    Bacteria None Seen None /hpf    Epithelial Cells 0-2 0 - 5 /hpf    Urine Casts 0-2 0 - 2 /lpf   POCT glucose device results    Collection Time: 05/19/25 12:41 PM   Result Value Ref Range    POC Glucose, Blood 123 (H) 65 - 99 mg/dL       Fluids    Intake/Output Summary (Last 24 hours) at 5/19/2025 1531  Last data filed at 5/19/2025 1400  Gross per 24 hour   Intake 1687.5 ml   Output 825 ml   Net 862.5 ml       Core Measures & Quality Metrics  Labs reviewed, Radiology images reviewed and Medications reviewed  Nicole catheter: Critically Ill - Requiring Accurate Measurement of Urinary Output      DVT Prophylaxis: Contraindicated - High bleeding risk  DVT prophylaxis - mechanical: SCDs  Ulcer prophylaxis: Yes        RAP Score Total: 11    CAGE Results: not completed Blood Alcohol>0.08: no        Assessment/Plan  * Trauma- (present on admission)  Assessment & Plan  Ground level fall, syncopal.  Trauma Green Transfer Activation from [facility] in [place], [place].  Gonzalez Cavazos MD. Trauma Surgery.    Leukocytosis- (present on admission)  Assessment & Plan  WBC increased to 18.7. Afebrile.   - UA, blood cultures x 2, repeat lactate,  and procalcitonin pending per geriatrics.   5/18 Initiate prophylaxis for spontaneous bacterial peritonitis with Rocephin.   Monitor.     Coagulopathy (HCC)- (present on admission)  Assessment & Plan  Admission TEG prolonged K time, low clot angle, and low MA.   - 1u platelets, 2u cryoprecipitate.   Post transfusion TEG with improved K time, clot angle, and MA. Inhibition remains elevated.   - 1u platelets transfused.     Syncopal episodes- (present on admission)  Assessment & Plan  Witnessed syncopal episodes with EMS, hypotension.  CT head from OSH without evidence of intracranial hemorrhage.  EKG SR rate 90, prolonged QTc. No ST segment changes.   Ongoing resuscitation.  Continuous telemetry monitoring.    Hypovolemic shock (HCC)- (present on admission)  Assessment & Plan  Admission Hgb 8.4 from 10.7 at OSH.   Total resuscitation prior to arrival: 3L crystalloid.  Total resuscitation on admission: 1u whole blood.   Serial hemograms.   Transfuse 1u PRBC for Hgb <7.     Closed fracture of right acetabulum (HCC)- (present on admission)  Assessment & Plan  Right acetabular fracture with associated pelvic hemorrhage.  5/17 IR embolization.   Definitive operative reduction and stabilization pending.  Weight bearing status - Nonweightbearing RLE.  Sudhakar Pompa MD. Orthopedic Surgeon. [facility].    Encounter for geriatric assessment- (present on admission)  Assessment & Plan  5/18 The patient is 65-74 years old and meets the following two or more criteria: 3 or more medications. A geriatrics consult is indicated  Maddi Godinez MD, Geriatric Hospitalist.    Prolonged Q-T interval on ECG- (present on admission)  Assessment & Plan  Admission EKG QTc 616.  Avoid QT prolonging medications.  Continuous telemetry monitoring.   Monitor electrolytes.    Contraindication to deep vein thrombosis (DVT) prophylaxis- (present on admission)  Assessment & Plan  VTE prophylaxis initially contraindicated secondary to  elevated bleeding risk.  5/17 Trauma surveillance venous duplex ultrasonography ordered.    Thrombocytopenia (HCC)- (present on admission)  Assessment & Plan  Platelets 69.  1u platelets.   Trend.     Acute kidney injury superimposed on chronic kidney disease (HCC)- (present on admission)  Assessment & Plan  Cr 2.24, unknown baseline.  Fluid resuscitation.  Trend renal indices.     Cirrhosis of liver with ascites (HCC)- (present on admission)  Assessment & Plan  MELD 31.  Chronic condition treated with lactulose, spironolactone, lasix, midodrine.  Resume diuretics pending hemodynamic stability.  Lactulose resumed.     Hematemesis with nausea- (present on admission)  Assessment & Plan  Reported dark, coffee ground emesis.   History of liver cirrhosis.   Protonix BID.    Benign prostatic hyperplasia with urinary frequency- (present on admission)  Assessment & Plan  Chronic condition treated with tamsulosin and finasteride.  Not currently prescribed per medication reconciliation.   Monitor for retention.       Discussed patient condition with Family, RN, Patient, trauma surgery, and ICU rounds. Sanju Miramontes MD

## 2025-05-19 NOTE — ASSESSMENT & PLAN NOTE
Bloody stool  Ceftriaxone and PPI twice daily  Hemoglobin every 8 hours  GI was consulted    5/21/2025  Discontinue ceftriaxone as low suspicion for esophageal variceal bleeding.

## 2025-05-19 NOTE — CONSULTS
..GASTROENTEROLOGY CONSULTATION    PATIENT NAME: Evelio Pressley  : 1950  CSN: [id number]  MRN:  [id number]     CONSULTATION DATE:  2025    PRIMARY CARE PROVIDER:  Pcp Not In Computer      REASON FOR CONSULT:  Bloody stool and anemia    CONSULT REQUESTED BY:  Dr. Maddi Godinez    HISTORY OF PRESENT ILLNESS:  Evelio Pressley is a 74 y.o. male with history of decompensated alcoholic cirrhosis, ascites, originally seen at [facility] the morning of 2025 after syncope and a fall. Per chart review he had multiple bouts of dark emesis. He was found to have a right acetabular fracture with associated hemorrhage in the pelvis and lower abdomen extending into the hip joint. 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 He was evaluated by surgery who recommended no urgent fixation. IR embolized right iliac artery on .  Pending definitive operative reduction and stabilization, remains nonweightbearing RLE.     Gastroenterology consulted for new onset bloody stools today. Hgb on admission 8.4, down to 6.6 and now 7.0 this am. Has received a total of 6 units PRBC since admission. Having multiple watery bowel movements and subsequently had a large bloody movement today.      EKG yesterday with QTC on 515. Leukocytosis persists with WBC 18.7. Lactic acidosis persists. Tachypneic and hypertensive.    Patient seen with son bedside and daughter was on the telephone. He underwent EGD last year which she reports was negative for varices. In her opinion his mental status is improving. However during our interview he kept falling asleep and had asterixis on exam. She admits that she did not see any blood in his bowel movements.    PAST MEDICAL HISTORY:  Past Medical History[1]    PAST SURGICAL HISTORY:  Past Surgical History[2]     CURRENT MEDS:  Current Medications[3]     ALLERGIES:  Allergies[4]    SOCIAL HISTORY:  Social History     Socioeconomic History    Marital status:  "Unknown     Spouse name: Not on file    Number of children: Not on file    Years of education: Not on file    Highest education level: Not on file   Occupational History    Not on file   Tobacco Use    Smoking status: Unknown    Smokeless tobacco: Not on file   Substance and Sexual Activity    Alcohol use: Not on file    Drug use: Not on file    Sexual activity: Not on file   Other Topics Concern    Not on file   Social History Narrative    Not on file     Social Drivers of Health     Financial Resource Strain: Not on file   Food Insecurity: Not on file   Transportation Needs: Not on file   Physical Activity: Not on file   Stress: Not on file   Social Connections: Not on file   Intimate Partner Violence: Not At Risk (5/18/2025)    Humiliation, Afraid, Rape, and Kick questionnaire     Fear of Current or Ex-Partner: No     Emotionally Abused: No     Physically Abused: No     Sexually Abused: No   Housing Stability: Not on file       FAMILY HISTORY:  No family history on file.     REVIEW OF SYSTEMS:  General ROS: Negative for - chills, fever, night sweats or weight loss.  HEENT ROS: Negative  Respiratory ROS: Negative for - cough or shortness of breath.  Cardiovascular ROS:  Negative for - chest pain or palpitations.  Gastrointestinal ROS: As per the history of present illness.  Genito-Urinary ROS: Negative  Musculoskeletal ROS: Negative.  Neurological ROS: Negative  Skin ROS: negative  Hematology ROS: negative  Endocrinology ROS: Negative      PHYSICAL EXAM:  VITALS: /79   Pulse 91   Temp 36.2 °C (97.2 °F) (Temporal)   Resp (!) 28   Ht 1.727 m (5' 8\")   Wt 92.3 kg (203 lb 7.8 oz)   SpO2 96%   BMI 30.94 kg/m²   GEN:  Evelio Pressley is a 74 y.o. male in no acute distress.  HEENT: Mucous membranes pink and moist.  Sclera anicteric.    NECK:    Neck supple without lymphadenopathy or thyromegaly.  LUNGS: No visible dyspnea  HEART: Regular rate and rhythm.   ABD:  Distended, soft, no tenderness to " "palpation   RECTAL: Not done at this time.  EXT:  Without cyanosis, deformity or pitting edema.  SKIN:  Pink, warm, dry.  NEURO: Lethargic, +asterixis    LABS:  Recent Labs     05/17/25 2022 05/18/25 0217 05/19/25  0340   WBC 14.8* 14.9* 18.7*   .7* 105.6* 100.5*     Recent Labs     05/17/25 2023 05/18/25 0217 05/19/25  0340   GLUCOSE 182* 151* 102*   BUN 27* 29* 38*   CO2 13* 14* 16*     Lab Results   Component Value Date    INR 1.77 (H) 05/19/2025    INR 2.01 (H) 05/18/2025    INR 1.88 (H) 05/18/2025     No components found for: \"ALT\", \"AST\", \"GGT\", \"ALKPHOS\"  No results found for: \"BILINEO\"      @LASTIMGCAT([id number])@     @LASTIMGCAT([id number])@       IMPRESSION/PLAN:  Anemia secondary to pelvic fracture and acute blood loss hematochezia  Decompensated alcoholic cirrhosis with ascites  Leukocytosis   Thrombocytopenia  Lactic acidosis  Acute kidney injury  Elevated AST and T. 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 Gabriel Ortiz, DNP,  APRN  Gastroenterology           [1] No past medical history on file.  [2] No past surgical history on file.  [3]   Current Facility-Administered Medications   Medication Dose Route Frequency Provider Last Rate Last Admin    [Held by provider] furosemide (Lasix) tablet 20 mg  20 mg Oral FRANCESCA Collins P.A.-C.   20 mg at 05/19/25 1242    spironolactone (Aldactone) tablet 50 mg  50 mg Oral QAM Yessileola Collins P.A.-C.   50 mg at 05/19/25 1242    cefTRIAXone (Rocephin) syringe 2,000 " mg  2,000 mg Intravenous Q24HRS Clark Miramontes M.D.   2,000 mg at 05/19/25 1243    NS infusion   Intravenous Continuous Maddi Godinez M.D.        [Held by provider] lactulose 20 GM/30ML solution 30 mL  30 mL Oral BID Maddi Godinez M.D.   30 mL at 05/19/25 0610    Respiratory Therapy Consult   Nebulization Continuous RT Linsey M Wegener, A.P.R.NNabeel        docusate sodium (Colace) capsule 100 mg  100 mg Oral BID Linsey M Wegener, A.P.R.N.   100 mg at 05/18/25 1740    senna-docusate (Pericolace Or Senokot S) 8.6-50 MG per tablet 1 Tablet  1 Tablet Oral Nightly Linsey M Wegener, A.P.R.N.   1 Tablet at 05/18/25 2151    senna-docusate (Pericolace Or Senokot S) 8.6-50 MG per tablet 1 Tablet  1 Tablet Oral Q24HRS PRN Linsey M Wegener, A.P.R.N.        polyethylene glycol/lytes (Miralax) Packet 1 Packet  1 Packet Oral BID Linsey M Wegener, A.P.R.N.   1 Packet at 05/18/25 1740    magnesium hydroxide (Milk Of Magnesia) suspension 30 mL  30 mL Oral DAILY AT 1800 Linsey M Wegener, A.P.R.N.   30 mL at 05/18/25 1740    bisacodyl (Dulcolax) suppository 10 mg  10 mg Rectal Q24HRS PRN Linsey M Wegener, A.P.R.N.        sodium phosphate enema 1 Enema  1 Enema Rectal Once PRN Linsey M Wegener, A.P.R.N.        oxyCODONE immediate-release (Roxicodone) tablet 2.5 mg  2.5 mg Oral Q3HRS PRN Linsey M Wegener, A.P.R.N.        Or    oxyCODONE immediate-release (Roxicodone) tablet 5 mg  5 mg Oral Q3HRS PRN Linsey M Wegener, A.P.R.N.        Or    HYDROmorphone (Dilaudid) injection 0.25 mg  0.25 mg Intravenous Q3HRS PRN Linsey M Wegener, A.P.R.N.        gabapentin (Neurontin) capsule 100 mg  100 mg Oral Q8HRS Linsey M Wegener, A.P.R.N.   100 mg at 05/19/25 0609    metaxalone (Skelaxin) tablet 400 mg  400 mg Oral BID Linsey M Wegener, A.P.R.N.   400 mg at 05/19/25 0609    lidocaine (Asperflex) 4 % patch 1 Patch  1 Patch Transdermal Q24HR Linsey M Wegener, A.P.R.N.   1 Patch at 05/18/25 8741    bacitracin-polymyxin b (Polysporin) [id number]  UNIT/GM ointment   Topical TID Linsey M Wegener, A.P.R.N.   1 Each at 05/19/25 1243    insulin lispro (HumaLOG,AdmeLOG) subcutaneous injection  1-6 Units Subcutaneous Q6HRS Linsey M Wegener, A.P.R.N.   1 Units at 05/17/25 2334    And    dextrose 50 % (D50W) injection 25 g  25 g Intravenous Q15 MIN PRN Linsey M Wegener, A.P.R.N.        pantoprazole (Protonix) injection 40 mg  40 mg Intravenous BID Linsey M Wegener, A.P.R.N.   40 mg at 05/19/25 0610   [4]   Allergies  Allergen Reactions    Penicillins Anaphylaxis     Per chart review patient has tolerated cefdinir, ceftriaxone, and cephalexin.

## 2025-05-19 NOTE — CONSULTS
Hospital Medicine Consultation    Date of Service  5/19/2025    Referring Physician  Sanju Miramontes MD    Consulting Physician  Maddi Godinez M.D.    Reason for Consultation  Medical management and cirrhosis    History of Presenting Illness  74-year-old male with history of osteoarthritis, alcohol abuse and cirrhosis who presented 5/17 with a ground-level fall.  Patient was evaluated by trauma and they found right acetabular fracture and active pelvic extravascular bleeding, IR was consulted and patient underwent embolization.  Ortho also was evaluated and no intervention recommended none weight bearing with motion as tolerated on the right lower extremity.     Daughter lives with the patient, denied any significant recurrent falls, patient has been medically stable, patient is independent and no significant history of dementia.    Internal medicine was consulted on 5/19, patient was evaluated and examined at bedside, patient very lethargic, confusing, patient was dry, patient had bloody stool, hemoglobin around 7, developed leukocytosis, sepsis protocol was ordered, GI was consulted.     Review of Systems  Review of Systems   Constitutional:  Positive for malaise/fatigue and weight loss. Negative for chills and fever.   HENT:  Negative for ear pain, hearing loss and tinnitus.    Eyes:  Negative for blurred vision, double vision and photophobia.   Respiratory:  Negative for cough and hemoptysis.    Cardiovascular:  Negative for chest pain, palpitations, orthopnea and claudication.   Gastrointestinal:  Positive for abdominal pain and blood in stool. Negative for constipation, diarrhea, nausea and vomiting.   Genitourinary:  Negative for dysuria, frequency and urgency.   Musculoskeletal:  Negative for myalgias and neck pain.   Skin:  Negative for rash.   Neurological:  Negative for dizziness, speech change and weakness.       Past Medical History  History of alcoholism, cirrhosis    Surgical  "History  Embolization    Family History  Reviewed    Social History       Medications  Prior to Admission Medications   Prescriptions Last Dose Informant Patient Reported? Taking?   Ascorbic Acid (VITAMIN C PO) 5/16/2025 Morning Family Member Yes Yes   Sig: Take 1 Tablet by mouth every morning. \"Chewable\"   CALCIUM PO 5/16/2025 Morning Family Member Yes Yes   Sig: Take 2 Tablets by mouth every morning. \"Chewable\"   MILK THISTLE PO 5/16/2025 Evening Family Member Yes Yes   Sig: Take 1 Capsule by mouth 2 times a day.   Multiple Vitamins-Minerals (MULTI ADULT GUMMIES) Chew Tab 5/16/2025 Morning Family Member Yes Yes   Sig: Chew 2 Tablets every morning.   Naproxen Sodium (ALEVE) 220 MG Cap 5/17/2025 at 11:00 AM Family Member Yes Yes   Sig: Take 220 mg by mouth Once.   bismuth subsalicylate (PEPTO-BISMOL) 262 MG Chew Tab 5/17/2025 at 11:00 AM Family Member Yes Yes   Sig: Chew 262 mg Once.   folic acid (FOLVITE) 800 MCG tablet 5/16/2025 Morning Family Member Yes Yes   Sig: Take 800 mcg by mouth every morning.   furosemide (LASIX) 40 MG Tab 5/16/2025 Morning Family Member Yes Yes   Sig: Take 40 mg by mouth every morning.   potassium chloride (MICRO-K) 10 MEQ capsule 5/16/2025 Evening Family Member Yes No   Sig: Take 10 mEq by mouth 2 times a day.   spironolactone (ALDACTONE) 100 MG Tab 5/16/2025 Morning Family Member Yes No   Sig: Take 100 mg by mouth every morning.   thiamine (VITAMIN B-1) 100 MG Tab 5/16/2025 Morning Family Member Yes Yes   Sig: Take 100 mg by mouth every morning.      Facility-Administered Medications: None       Allergies  Allergies[1]    Physical Exam  Temp:  [36.2 °C (97.2 °F)] 36.2 °C (97.2 °F)  Pulse:  [66-95] 91  Resp:  [15-39] 28  BP: ()/(51-79) 128/79  SpO2:  [90 %-99 %] 96 %    Physical Exam  Constitutional:       General: He is in acute distress.      Appearance: He is ill-appearing.   Eyes:      General: Scleral icterus present.   Cardiovascular:      Rate and Rhythm: Normal rate.      " Heart sounds: No murmur heard.  Pulmonary:      Effort: No respiratory distress.      Breath sounds: No wheezing or rales.   Abdominal:      General: There is distension.      Palpations: Abdomen is soft.      Tenderness: There is no abdominal tenderness. There is no right CVA tenderness, left CVA tenderness or guarding.   Musculoskeletal:      Right lower leg: No edema.      Left lower leg: No edema.   Lymphadenopathy:      Cervical: No cervical adenopathy.   Skin:     General: Skin is dry.      Coloration: Skin is jaundiced and pale.      Findings: No bruising, lesion or rash.   Neurological:      General: No focal deficit present.      Mental Status: He is alert. Mental status is at baseline. He is disoriented.      Cranial Nerves: No cranial nerve deficit.      Motor: No weakness.      Gait: Gait normal.         Fluids  Date 05/19/25 0700 - 05/20/25 0659   Shift 4379-0269 0947-2285 4069-9146 24 Hour Total   INTAKE   I.V. 323.6   323.6   IV Piggyback 20   20   Shift Total 343.6   343.6   OUTPUT   Urine 200   200   Shift Total 200   200   Weight (kg) 92.3 92.3 92.3 92.3       Laboratory  Recent Labs     05/17/25 2022 05/18/25 0217 05/18/25  0802 05/18/25  1435 05/18/25 2052 05/19/25  0340   WBC 14.8* 14.9*  --   --   --  18.7*   RBC 2.25* 2.15*  --   --   --  2.05*   HEMOGLOBIN 8.4* 7.6*   < > 6.7* 6.6* 7.0*   HEMATOCRIT 25.8* 22.7*  --   --   --  20.6*   .7* 105.6*  --   --   --  100.5*   MCH 37.3* 35.3*  --   --   --  34.1*   MCHC 32.6 33.5  --   --   --  34.0   RDW 60.5* 68.3*  --   --   --  77.9*   PLATELETCT 69* 101*  --   --   --  102*   MPV 11.4 9.8  --   --   --  9.9    < > = values in this interval not displayed.     Recent Labs     05/17/25 2023 05/18/25 0217 05/19/25  0340   SODIUM 134* 136 136   POTASSIUM 4.9 5.4 4.4   CHLORIDE 105 108 109   CO2 13* 14* 16*   GLUCOSE 182* 151* 102*   BUN 27* 29* 38*   CREATININE 2.24* 2.20* 1.87*   CALCIUM 8.4* 8.6 8.2*     Recent Labs     05/17/25  2030  05/18/25  0217 05/18/25  1435 05/19/25  1232   APTT 43.0*  --   --   --    INR 3.14* 1.88* 2.01* 1.77*                 Imaging  DX-CHEST-PORTABLE (1 VIEW)   Final Result      1.  Subsegmental atelectasis in the left lower lobe.   2.  Atherosclerosis.   3.  The remainder of the chest radiography is within normal limits.      US-TRAUMA VEIN SCREEN LOWER BILAT EXTREMITY   Final Result      IR-EMBOLIZATION   Final Result      1.  Pelvic angiogram demonstrating no evidence of active extravasation, pseudoaneurysm or truncated vessel.   2.  Empiric Gelfoam embolization of the right internal iliac artery anterior division.   3.  Right common femoral arteriogram demonstrating appropriate access over the femoral head.   4.  Left internal iliac artery angiogram demonstrating no evidence of active extravasation, pseudoaneurysm or truncated vessel. Empiric embolization was not performed on the left due to lack of conventional angiographic findings which correlates with    recent CT findings demonstrating no evidence of injury in this region.         CT-CSPINE WITHOUT PLUS RECONS   Final Result      No acute fracture is identified.      DX-PELVIS-1 OR 2 VIEWS   Final Result      Right acetabular fracture.      DX-PELVIS-TRAUMA SERIES  3-    (Results Pending)   US-RUQ    (Results Pending)       Assessment/Plan  Anemia due to blood loss  Assessment & Plan  Underwent embolization by IR for intrapelvic bleeding  Patient developed lower GI bleeding  Ceftriaxone  GI was consulted  Monitor hemoglobin every 8 hours and blood transfusion    Fall  Assessment & Plan  Mechanical fall  PT and OT  Pain control  Trauma board  Patient is at high risk for surgery, orthopedic did not recommend intervention    Acute kidney injury superimposed on chronic kidney disease (HCC)- (present on admission)  Assessment & Plan  His creatinine around 1.4 and came with creatinine 2.2  Improved with IV fluid  Bladder scan to rule out retention  Avoid nephrotoxic  medications  Continue IV fluid    GI bleeding  Assessment & Plan  Bloody stool  Ceftriaxone and PPI twice daily  Hemoglobin every 8 hours  GI was consulted    Leukocytosis- (present on admission)  Assessment & Plan  Sepsis workup including blood culture pending    Lactic acid acidosis  Assessment & Plan  Improvement in lactic acidosis however still lactic is elevated  Continue IV fluid  Sepsis workup.    Coagulopathy (HCC)- (present on admission)  Assessment & Plan  Due to cirrhosis with INR more than 1.8  Consider vitamin K   INR daily    Benign prostatic hyperplasia with urinary frequency- (present on admission)  Assessment & Plan  Bladder scan and Flomax if needed    Thrombocytopenia (HCC)- (present on admission)  Assessment & Plan  Due to cirrhosis  101    Hypovolemic shock (HCC)- (present on admission)  Assessment & Plan  Needed IR embolization  Blood transfusion if needed less than 7  Hemoglobin every 8 hours  Patient needs blood transfusion    Cirrhosis of liver with ascites (HCC)- (present on admission)  Assessment & Plan  History of cirrhosis due to alcoholism  INR 1.8 and bilirubin more than 2  MELD 24  Check ammonia and started lactulose  No volume overload, start with IV fluid  Suspicious of GI bleeding, start with ceftriaxone and PPI  GI was consulted    Closed fracture of right acetabulum (HCC)- (present on admission)  Assessment & Plan  Ortho on board  Pain control      Patient is has a high medical complexity, complex decision making and is at high risk for complication, morbidity, and mortality.  I spent 65 minutes, reviewing the chart, obtaining and/or reviewing separately obtained history. Performing a medically appropriate examination and evaluation.  Counseling and educating the patient. Ordering and reviewing medications, tests, or procedures.   Documenting clinical information in EPIC. Independently interpreting results and communicating results to patient. Discussing future disposition of  care with patient, RN and case management.           [1]   Allergies  Allergen Reactions    Penicillins Anaphylaxis     Per chart review patient has tolerated cefdinir, ceftriaxone, and cephalexin.

## 2025-05-19 NOTE — ASSESSMENT & PLAN NOTE
His creatinine around 1.4 and came with creatinine 2.2  Improved with IV fluid  Bladder scan to rule out retention  Avoid nephrotoxic medications  Continue IV fluid    5/20/2025  Creatinine improved to 1.69.  Continue NS at 83 mL/h.    5/22/2025  Creatinine improving to 1.17.  Continuing normal saline at 83 mL/h.  5/21/2025  Creatinine improving to 1.4.  Continue NS at 83 mL/h.

## 2025-05-19 NOTE — THERAPY
Occupational Therapy Contact Note    Patient Name: Evelio Pressley  Age:  74 y.o., Sex:  male  Medical Record #: [id number]  Today's Date: 5/19/2025    Discussed missed therapy with Golden       05/19/25 [id number]   Interdisciplinary Plan of Care Collaboration   Collaboration Comments OT eval attempted, pt has strict bedrest ordered.

## 2025-05-19 NOTE — ASSESSMENT & PLAN NOTE
Underwent embolization by IR for intrapelvic bleeding  Patient developed lower GI bleeding  Ceftriaxone  GI was consulted  Monitor hemoglobin every 8 hours and blood transfusion    5/21/2025  Continue to monitor hemoglobin every 8 hours and transfuse for hemoglobin less than 7.  Patient is at high risk of rebleeding due to necrotic mass in the colon.

## 2025-05-19 NOTE — CARE PLAN
The patient is Stable - Low risk of patient condition declining or worsening    Shift Goals  Clinical Goals: Hemodynamic stability, Trend HGB Q6, Mobility  Patient Goals: Rest  Family Goals: Updates      Problem: Knowledge Deficit - Standard  Goal: Patient and family/care givers will demonstrate understanding of plan of care, disease process/condition, diagnostic tests and medications  Outcome: Progressing     Problem: Pain - Standard  Goal: Alleviation of pain or a reduction in pain to the patient’s comfort goal  Outcome: Progressing     Problem: Skin Integrity  Goal: Skin integrity is maintained or improved  Outcome: Progressing     Problem: Fall Risk  Goal: Patient will remain free from falls  Outcome: Progressing

## 2025-05-19 NOTE — ASSESSMENT & PLAN NOTE
Improvement in lactic acidosis however still lactic is elevated  Continue IV fluid  Sepsis workup.

## 2025-05-20 ENCOUNTER — APPOINTMENT (OUTPATIENT)
Dept: RADIOLOGY | Facility: MEDICAL CENTER | Age: 75
DRG: 270 | End: 2025-05-20
Attending: PHYSICIAN ASSISTANT
Payer: MEDICARE

## 2025-05-20 ENCOUNTER — APPOINTMENT (OUTPATIENT)
Dept: RADIOLOGY | Facility: MEDICAL CENTER | Age: 75
DRG: 270 | End: 2025-05-20
Attending: NURSE PRACTITIONER
Payer: MEDICARE

## 2025-05-20 PROBLEM — D62 ACUTE BLOOD LOSS ANEMIA (ABLA): Status: ACTIVE | Noted: 2025-05-19

## 2025-05-20 LAB
ALBUMIN SERPL BCP-MCNC: 2.7 G/DL (ref 3.2–4.9)
ALBUMIN/GLOB SERPL: 1.1 G/DL
ALP SERPL-CCNC: 61 U/L (ref 30–99)
ALT SERPL-CCNC: 23 U/L (ref 2–50)
AMMONIA PLAS-SCNC: 53 UMOL/L (ref 11–45)
ANION GAP SERPL CALC-SCNC: 8 MMOL/L (ref 7–16)
ANISOCYTOSIS BLD QL SMEAR: ABNORMAL
AST SERPL-CCNC: 54 U/L (ref 12–45)
BASOPHILS # BLD AUTO: 0 % (ref 0–1.8)
BASOPHILS # BLD: 0 K/UL (ref 0–0.12)
BILIRUB SERPL-MCNC: 2.2 MG/DL (ref 0.1–1.5)
BUN SERPL-MCNC: 46 MG/DL (ref 8–22)
BURR CELLS BLD QL SMEAR: NORMAL
CALCIUM ALBUM COR SERPL-MCNC: 9.2 MG/DL (ref 8.5–10.5)
CALCIUM SERPL-MCNC: 8.2 MG/DL (ref 8.5–10.5)
CHLORIDE SERPL-SCNC: 110 MMOL/L (ref 96–112)
CO2 SERPL-SCNC: 17 MMOL/L (ref 20–33)
CREAT SERPL-MCNC: 1.69 MG/DL (ref 0.5–1.4)
EOSINOPHIL # BLD AUTO: 0 K/UL (ref 0–0.51)
EOSINOPHIL NFR BLD: 0 % (ref 0–6.9)
ERYTHROCYTE [DISTWIDTH] IN BLOOD BY AUTOMATED COUNT: 76.3 FL (ref 35.9–50)
GFR SERPLBLD CREATININE-BSD FMLA CKD-EPI: 42 ML/MIN/1.73 M 2
GLOBULIN SER CALC-MCNC: 2.4 G/DL (ref 1.9–3.5)
GLUCOSE BLD STRIP.AUTO-MCNC: 102 MG/DL (ref 65–99)
GLUCOSE BLD STRIP.AUTO-MCNC: 105 MG/DL (ref 65–99)
GLUCOSE BLD STRIP.AUTO-MCNC: 94 MG/DL (ref 65–99)
GLUCOSE BLD STRIP.AUTO-MCNC: 95 MG/DL (ref 65–99)
GLUCOSE SERPL-MCNC: 97 MG/DL (ref 65–99)
HCT VFR BLD AUTO: 23.3 % (ref 42–52)
HGB BLD-MCNC: 8 G/DL (ref 14–18)
INR PPP: 1.79 (ref 0.87–1.13)
LYMPHOCYTES # BLD AUTO: 0.95 K/UL (ref 1–4.8)
LYMPHOCYTES NFR BLD: 6.1 % (ref 22–41)
MACROCYTES BLD QL SMEAR: ABNORMAL
MANUAL DIFF BLD: NORMAL
MCH RBC QN AUTO: 34.8 PG (ref 27–33)
MCHC RBC AUTO-ENTMCNC: 34.3 G/DL (ref 32.3–36.5)
MCV RBC AUTO: 101.3 FL (ref 81.4–97.8)
MONOCYTES # BLD AUTO: 0.6 K/UL (ref 0–0.85)
MONOCYTES NFR BLD AUTO: 3.5 % (ref 0–13.4)
MORPHOLOGY BLD-IMP: NORMAL
NEUTROPHILS # BLD AUTO: 14.1 K/UL (ref 1.82–7.42)
NEUTROPHILS NFR BLD: 90.4 % (ref 44–72)
NRBC # BLD AUTO: 0.03 K/UL
NRBC BLD-RTO: 0.2 /100 WBC (ref 0–0.2)
PLATELET # BLD AUTO: 97 K/UL (ref 164–446)
PLATELET BLD QL SMEAR: NORMAL
PLATELETS.RETICULATED NFR BLD AUTO: 1.2 % (ref 0.6–13.1)
PMV BLD AUTO: 10.3 FL (ref 9–12.9)
POIKILOCYTOSIS BLD QL SMEAR: NORMAL
POTASSIUM SERPL-SCNC: 4.4 MMOL/L (ref 3.6–5.5)
PROCALCITONIN SERPL-MCNC: 0.69 NG/ML
PROT SERPL-MCNC: 5.1 G/DL (ref 6–8.2)
PROTHROMBIN TIME: 20.9 SEC (ref 12–14.6)
RBC # BLD AUTO: 2.3 M/UL (ref 4.7–6.1)
RBC BLD AUTO: PRESENT
SODIUM SERPL-SCNC: 135 MMOL/L (ref 135–145)
WBC # BLD AUTO: 15.6 K/UL (ref 4.8–10.8)

## 2025-05-20 PROCEDURE — 302307 BAG FECAL MANAGEMENT TEMPORARY COLLECTION WITH FILTER - SEAL SIGNAL FMS: Performed by: STUDENT IN AN ORGANIZED HEALTH CARE EDUCATION/TRAINING PROGRAM

## 2025-05-20 PROCEDURE — A9270 NON-COVERED ITEM OR SERVICE: HCPCS

## 2025-05-20 PROCEDURE — 97163 PT EVAL HIGH COMPLEX 45 MIN: CPT

## 2025-05-20 PROCEDURE — 700101 HCHG RX REV CODE 250

## 2025-05-20 PROCEDURE — 36415 COLL VENOUS BLD VENIPUNCTURE: CPT

## 2025-05-20 PROCEDURE — 82962 GLUCOSE BLOOD TEST: CPT

## 2025-05-20 PROCEDURE — 80053 COMPREHEN METABOLIC PANEL: CPT

## 2025-05-20 PROCEDURE — 76705 ECHO EXAM OF ABDOMEN: CPT

## 2025-05-20 PROCEDURE — A9270 NON-COVERED ITEM OR SERVICE: HCPCS | Performed by: PHYSICIAN ASSISTANT

## 2025-05-20 PROCEDURE — 99232 SBSQ HOSP IP/OBS MODERATE 35: CPT | Performed by: SPECIALIST

## 2025-05-20 PROCEDURE — 700105 HCHG RX REV CODE 258: Performed by: SURGERY

## 2025-05-20 PROCEDURE — 97535 SELF CARE MNGMENT TRAINING: CPT

## 2025-05-20 PROCEDURE — 700102 HCHG RX REV CODE 250 W/ 637 OVERRIDE(OP)

## 2025-05-20 PROCEDURE — 85610 PROTHROMBIN TIME: CPT

## 2025-05-20 PROCEDURE — 99233 SBSQ HOSP IP/OBS HIGH 50: CPT | Performed by: STUDENT IN AN ORGANIZED HEALTH CARE EDUCATION/TRAINING PROGRAM

## 2025-05-20 PROCEDURE — 82140 ASSAY OF AMMONIA: CPT

## 2025-05-20 PROCEDURE — 770001 HCHG ROOM/CARE - MED/SURG/GYN PRIV*

## 2025-05-20 PROCEDURE — 85027 COMPLETE CBC AUTOMATED: CPT

## 2025-05-20 PROCEDURE — 71045 X-RAY EXAM CHEST 1 VIEW: CPT

## 2025-05-20 PROCEDURE — 97530 THERAPEUTIC ACTIVITIES: CPT

## 2025-05-20 PROCEDURE — 700111 HCHG RX REV CODE 636 W/ 250 OVERRIDE (IP): Performed by: SURGERY

## 2025-05-20 PROCEDURE — 700105 HCHG RX REV CODE 258: Performed by: INTERNAL MEDICINE

## 2025-05-20 PROCEDURE — 700111 HCHG RX REV CODE 636 W/ 250 OVERRIDE (IP)

## 2025-05-20 PROCEDURE — 85055 RETICULATED PLATELET ASSAY: CPT

## 2025-05-20 PROCEDURE — 97167 OT EVAL HIGH COMPLEX 60 MIN: CPT

## 2025-05-20 PROCEDURE — 85007 BL SMEAR W/DIFF WBC COUNT: CPT

## 2025-05-20 PROCEDURE — 700101 HCHG RX REV CODE 250: Performed by: SPECIALIST

## 2025-05-20 PROCEDURE — 700102 HCHG RX REV CODE 250 W/ 637 OVERRIDE(OP): Performed by: PHYSICIAN ASSISTANT

## 2025-05-20 RX ORDER — ALBUTEROL SULFATE 5 MG/ML
2.5 SOLUTION RESPIRATORY (INHALATION)
Status: DISCONTINUED | OUTPATIENT
Start: 2025-05-20 | End: 2025-05-20

## 2025-05-20 RX ADMIN — Medication 1 EACH: at 06:01

## 2025-05-20 RX ADMIN — SPIRONOLACTONE 50 MG: 50 TABLET ORAL at 06:01

## 2025-05-20 RX ADMIN — GABAPENTIN 100 MG: 100 CAPSULE ORAL at 06:03

## 2025-05-20 RX ADMIN — SENNOSIDES, DOCUSATE SODIUM 1 TABLET: 50; 8.6 TABLET, FILM COATED ORAL at 21:50

## 2025-05-20 RX ADMIN — CEFTRIAXONE SODIUM 2000 MG: 10 INJECTION, POWDER, FOR SOLUTION INTRAVENOUS at 06:03

## 2025-05-20 RX ADMIN — POLYETHYLENE GLYCOL-3350 AND ELECTROLYTES 4 L: 236; 6.74; 5.86; 2.97; 22.74 POWDER, FOR SOLUTION ORAL at 18:11

## 2025-05-20 RX ADMIN — GABAPENTIN 100 MG: 100 CAPSULE ORAL at 21:50

## 2025-05-20 RX ADMIN — GABAPENTIN 100 MG: 100 CAPSULE ORAL at 13:51

## 2025-05-20 RX ADMIN — METAXALONE 400 MG: 800 TABLET ORAL at 06:04

## 2025-05-20 RX ADMIN — PANTOPRAZOLE SODIUM 40 MG: 40 INJECTION, POWDER, FOR SOLUTION INTRAVENOUS at 18:11

## 2025-05-20 RX ADMIN — LIDOCAINE 1 PATCH: 4 PATCH TOPICAL at 21:50

## 2025-05-20 RX ADMIN — SODIUM CHLORIDE: 9 INJECTION, SOLUTION INTRAVENOUS at 04:09

## 2025-05-20 RX ADMIN — PANTOPRAZOLE SODIUM 40 MG: 40 INJECTION, POWDER, FOR SOLUTION INTRAVENOUS at 06:04

## 2025-05-20 RX ADMIN — METAXALONE 400 MG: 800 TABLET ORAL at 18:22

## 2025-05-20 ASSESSMENT — COGNITIVE AND FUNCTIONAL STATUS - GENERAL
CLIMB 3 TO 5 STEPS WITH RAILING: TOTAL
CLIMB 3 TO 5 STEPS WITH RAILING: TOTAL
TOILETING: A LOT
STANDING UP FROM CHAIR USING ARMS: A LITTLE
MOBILITY SCORE: 7
MOVING FROM LYING ON BACK TO SITTING ON SIDE OF FLAT BED: TOTAL
STANDING UP FROM CHAIR USING ARMS: TOTAL
WALKING IN HOSPITAL ROOM: TOTAL
HELP NEEDED FOR BATHING: A LOT
SUGGESTED CMS G CODE MODIFIER MOBILITY: CL
HELP NEEDED FOR BATHING: TOTAL
MOBILITY SCORE: 12
WALKING IN HOSPITAL ROOM: TOTAL
MOVING TO AND FROM BED TO CHAIR: TOTAL
MOVING TO AND FROM BED TO CHAIR: A LITTLE
MOVING FROM LYING ON BACK TO SITTING ON SIDE OF FLAT BED: A LOT
DRESSING REGULAR UPPER BODY CLOTHING: A LOT
TOILETING: A LOT
SUGGESTED CMS G CODE MODIFIER DAILY ACTIVITY: CK
SUGGESTED CMS G CODE MODIFIER DAILY ACTIVITY: CL
TURNING FROM BACK TO SIDE WHILE IN FLAT BAD: A LOT
PERSONAL GROOMING: A LITTLE
DRESSING REGULAR LOWER BODY CLOTHING: A LOT
EATING MEALS: A LITTLE
DRESSING REGULAR UPPER BODY CLOTHING: A LITTLE
EATING MEALS: A LITTLE
DRESSING REGULAR LOWER BODY CLOTHING: TOTAL
DAILY ACTIVITIY SCORE: 12
DAILY ACTIVITIY SCORE: 15
SUGGESTED CMS G CODE MODIFIER MOBILITY: CM
TURNING FROM BACK TO SIDE WHILE IN FLAT BAD: A LOT
PERSONAL GROOMING: A LITTLE

## 2025-05-20 ASSESSMENT — ENCOUNTER SYMPTOMS
VOMITING: 0
WEAKNESS: 1
PALPITATIONS: 0
FEVER: 0
ABDOMINAL PAIN: 0
DIZZINESS: 0
SHORTNESS OF BREATH: 0
MYALGIAS: 0
HEADACHES: 0
NAUSEA: 0
CHILLS: 0
COUGH: 0

## 2025-05-20 ASSESSMENT — GAIT ASSESSMENTS: GAIT LEVEL OF ASSIST: UNABLE TO PARTICIPATE

## 2025-05-20 ASSESSMENT — ACTIVITIES OF DAILY LIVING (ADL): TOILETING: INDEPENDENT

## 2025-05-20 ASSESSMENT — PAIN DESCRIPTION - PAIN TYPE
TYPE: ACUTE PAIN
TYPE: ACUTE PAIN

## 2025-05-20 ASSESSMENT — FIBROSIS 4 INDEX: FIB4 SCORE: 7.46

## 2025-05-20 NOTE — PROGRESS NOTES
Repeat x rays reviewed showing maintained hip position without protrusio   Due to current medical status recommend non operative management  NWB motion as tolerated LLE  Discharge when medically appropriate   Follow up in clinic after discharge        Sudhakar Pompa MD  Orthopedic Trauma Surgery

## 2025-05-20 NOTE — PROGRESS NOTES
..Gastroenterology Progress Note               Author:  Lilibeth Ortiz, DNP,  APRN Date & Time Created: 5/20/2025 8:11 AM       Patient ID:  Name:             Evelio Pressley  YOB: 1950  Age:                 74 y.o.  male  MRN:               [id number]    Medical Decision Making, by Problem:  Active Hospital Problems    Diagnosis     Lactic acid acidosis [E87.20]     Leukocytosis [D72.829]     Anemia due to blood loss [D50.0]     GI bleeding [K92.2]     Fall [W19.XXXA]     Trauma [T14.90XA]     Closed fracture of right acetabulum (HCC) [S32.401A]     Hematemesis with nausea [K92.0]     Cirrhosis of liver with ascites (HCC) [K74.60, R18.8]     Acute kidney injury superimposed on chronic kidney disease (HCC) [N17.9, N18.9]     Hypovolemic shock (HCC) [R57.1]     Syncopal episodes [R55]     Thrombocytopenia (HCC) [D69.6]     Contraindication to deep vein thrombosis (DVT) prophylaxis [Z53.09]     Benign prostatic hyperplasia with urinary frequency [N40.1, R35.0]     Coagulopathy (HCC) [D68.9]     Prolonged Q-T interval on ECG [R94.31]      Presenting Chief Complaint:  Bloody stool and anemia    HISTORY OF PRESENT ILLNESS:  Evelio Pressley is a 74 y.o. male with history of decompensated alcoholic cirrhosis, ascites, originally seen at [facility] the morning of 5/17/2025 after syncope and a fall. Per chart review he had multiple bouts of dark emesis. He was found to have a right acetabular fracture with associated hemorrhage in the pelvis and lower abdomen extending into the hip joint. 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 He was evaluated by surgery who recommended no urgent fixation. IR embolized right iliac artery on 5/17.  Pending definitive operative reduction and stabilization, remains nonweightbearing RLE.      Gastroenterology consulted for reported bloody stools and down trending hemoglobin.     Interval History:  5/20/2025:  patient is more awake  today. He has had three bloody bowel movements. Hemoglobin is stable. Spoke to daughter and discussed encephalopathy and home care. He did not have enough fluid for paracentesis. He is not having pain in abdomen     5/21/2025: Colonoscopy   Colon polyps  Internal hemorrhoids grade 2  Ischemia in right colon and descending colon  ? Mass in right colon versus edema    5/22/2025: Patient seen with family bedside. Awake and alert and in good spirits. Eating well, had a normal BM. Hgb 8.3    Hospital Medications:  Current Facility-Administered Medications   Medication Dose Frequency Provider Last Rate Last Admin    [Held by provider] furosemide (Lasix) tablet 20 mg  20 mg FRANCESCA Collins P.A.-C.   20 mg at 05/19/25 1242    spironolactone (Aldactone) tablet 50 mg  50 mg MERLYN Velasco.A.-C.   50 mg at 05/20/25 0601    cefTRIAXone (Rocephin) syringe 2,000 mg  2,000 mg Q24HRS Clark Miramontes M.D.   2,000 mg at 05/20/25 0603    NS infusion   Continuous Maddi Godinez M.D. 83 mL/hr at 05/20/25 0409 New Bag at 05/20/25 0409    [Held by provider] lactulose 20 GM/30ML solution 30 mL  30 mL BID Maddi Godinez M.D.   30 mL at 05/19/25 0610    Respiratory Therapy Consult   Continuous RT Linsey M Wegener, A.P.R.NNabeel        docusate sodium (Colace) capsule 100 mg  100 mg BID Linsey M Wegener, A.P.R.N.   100 mg at 05/18/25 1740    senna-docusate (Pericolace Or Senokot S) 8.6-50 MG per tablet 1 Tablet  1 Tablet Nightly Linsey M Wegener, A.P.R.N.   1 Tablet at 05/18/25 2151    senna-docusate (Pericolace Or Senokot S) 8.6-50 MG per tablet 1 Tablet  1 Tablet Q24HRS PRN Linsey M Wegener, A.P.R.N.        polyethylene glycol/lytes (Miralax) Packet 1 Packet  1 Packet BID Linsey M Wegener, A.P.R.N.   1 Packet at 05/18/25 1740    magnesium hydroxide (Milk Of Magnesia) suspension 30 mL  30 mL DAILY AT 1800 Linsey M Wegener, A.P.RNabeelN.   30 mL at 05/18/25 1740    bisacodyl (Dulcolax) suppository 10 mg  10 mg Q24HRS PRN Margaret  M Wegener, A.P.R.N.        sodium phosphate enema 1 Enema  1 Enema Once PRN Linsey M Wegener, A.P.R.N.        oxyCODONE immediate-release (Roxicodone) tablet 2.5 mg  2.5 mg Q3HRS PRN Linsey M Wegener, A.P.R.N.        Or    oxyCODONE immediate-release (Roxicodone) tablet 5 mg  5 mg Q3HRS PRN Linsey M Wegener, A.P.R.N.        Or    HYDROmorphone (Dilaudid) injection 0.25 mg  0.25 mg Q3HRS PRN Linsey M Wegener, A.P.R.N.        gabapentin (Neurontin) capsule 100 mg  100 mg Q8HRS Linsey M Wegener, A.P.R.N.   100 mg at 05/20/25 0603    metaxalone (Skelaxin) tablet 400 mg  400 mg BID Linsey M Wegener, A.P.R.N.   400 mg at 05/20/25 0604    lidocaine (Asperflex) 4 % patch 1 Patch  1 Patch Q24HR Linsey M Wegener, A.P.R.N.   1 Patch at 05/18/25 2151    bacitracin-polymyxin b (Polysporin) 500-45045 UNIT/GM ointment   TID Linsey M Wegener, A.P.R.N.   1 Each at 05/20/25 0601    insulin lispro (HumaLOG,AdmeLOG) subcutaneous injection  1-6 Units Q6HRS Linsey M Wegener, A.P.R.N.   1 Units at 05/17/25 2334    And    dextrose 50 % (D50W) injection 25 g  25 g Q15 MIN PRN Linsey M Wegener, A.P.R.N.        pantoprazole (Protonix) injection 40 mg  40 mg BID Linsey M Wegener, A.P.R.N.   40 mg at 05/20/25 0604   Last reviewed on 5/18/2025  8:19 AM by Jones Gasca       Review of Systems:  Review of Systems   Constitutional:  Negative for chills, fever and malaise/fatigue.   HENT:  Negative for hearing loss.    Eyes:  Negative for blurred vision.   Respiratory:  Negative for cough and shortness of breath.    Cardiovascular:  Positive for leg swelling. Negative for chest pain.   Gastrointestinal:  Negative for abdominal pain, blood in stool, constipation, diarrhea, heartburn, melena, nausea and vomiting.   Genitourinary:  Negative for dysuria.   Musculoskeletal:  Negative for back pain.   Skin:  Negative for rash.   Neurological:  Positive for weakness. Negative for dizziness.   Psychiatric/Behavioral:  Negative for depression.    All  "other systems reviewed and are negative.    Vital signs:  Weight/BMI: Body mass index is 30.44 kg/m².  /68   Pulse 91   Temp 36.2 °C (97.2 °F) (Temporal)   Resp 16   Ht 1.727 m (5' 8\")   Wt 90.8 kg (200 lb 2.8 oz)   SpO2 97%   Vitals:    05/19/25 1942 05/19/25 2336 05/20/25 0207 05/20/25 0427   BP: 129/63 123/58 138/65 137/68   Pulse: 81 86 88 91   Resp: 19 20 16 16   Temp: 36.2 °C (97.2 °F) 36.1 °C (97 °F) 36.2 °C (97.2 °F) 36.2 °C (97.2 °F)   TempSrc: Temporal Temporal Temporal Temporal   SpO2: 97% 96% 97% 97%   Weight:    90.8 kg (200 lb 2.8 oz)   Height:         Oxygen Therapy:  Pulse Oximetry: 97 %, O2 (LPM): 1, O2 Delivery Device: Silicone Nasal Cannula    Intake/Output Summary (Last 24 hours) at 5/20/2025 0811  Last data filed at 5/20/2025 0400  Gross per 24 hour   Intake 433.55 ml   Output 550 ml   Net -116.45 ml       Physical Exam  Vitals and nursing note reviewed.   Constitutional:       General: He is not in acute distress.     Appearance: Normal appearance. He is not ill-appearing.   HENT:      Head: Normocephalic and atraumatic.      Right Ear: External ear normal.      Left Ear: External ear normal.      Nose: Nose normal.      Mouth/Throat:      Mouth: Mucous membranes are moist.      Pharynx: Oropharynx is clear.   Eyes:      General: No scleral icterus.  Cardiovascular:      Rate and Rhythm: Normal rate and regular rhythm.      Pulses: Normal pulses.      Heart sounds: Normal heart sounds.   Pulmonary:      Effort: Pulmonary effort is normal.      Breath sounds: Normal breath sounds.   Abdominal:      General: Abdomen is flat. Bowel sounds are normal. There is distension.      Palpations: Abdomen is soft.      Tenderness: There is no abdominal tenderness.   Musculoskeletal:         General: Normal range of motion.      Cervical back: Normal range of motion and neck supple.      Right lower leg: Edema present.      Left lower leg: Edema present.   Skin:     General: Skin is warm.      " Capillary Refill: Capillary refill takes less than 2 seconds.      Coloration: Skin is pale.   Neurological:      Mental Status: He is alert and oriented to person, place, and time.      Motor: Weakness present.   Psychiatric:         Mood and Affect: Mood normal.         Behavior: Behavior normal.         Labs:  Recent Labs     05/17/25 2325 05/18/25 0217 05/19/25  0340 05/20/25  0503   SODIUM  --  136 136 135   POTASSIUM  --  5.4 4.4 4.4   CHLORIDE  --  108 109 110   CO2  --  14* 16* 17*   BUN  --  29* 38* 46*   CREATININE  --  2.20* 1.87* 1.69*   MAGNESIUM 1.5  --  2.1  --    PHOSPHORUS 4.9*  --  3.2  --    CALCIUM  --  8.6 8.2* 8.2*     Recent Labs     05/18/25 0217 05/19/25 0340 05/20/25  0503   ALTSGPT 20 20 23   ASTSGOT 36 46* 54*   ALKPHOSPHAT 48 50 61   TBILIRUBIN 3.3* 3.0* 2.2*   GLUCOSE 151* 102* 97     Recent Labs     05/18/25 0217 05/19/25 0340 05/20/25  0503   WBC 14.9* 18.7* 15.6*   NEUTSPOLYS 83.80* 86.80* 90.40*   LYMPHOCYTES 5.10* 4.30* 6.10*   MONOCYTES 10.20 7.60 3.50   EOSINOPHILS 0.00 0.10 0.00   BASOPHILS 0.10 0.10 0.00   ASTSGOT 36 46* 54*   ALTSGPT 20 20 23   ALKPHOSPHAT 48 50 61   TBILIRUBIN 3.3* 3.0* 2.2*     Recent Labs     05/17/25  2030 05/18/25 0217 05/18/25  0802 05/18/25  1435 05/18/25  2052 05/19/25  0340 05/19/25  1232 05/19/25  1600 05/20/25  0503   RBC  --  2.15*  --   --   --  2.05*  --   --  2.30*   HEMOGLOBIN  --  7.6*   < > 6.7*   < > 7.0*  --  8.5* 8.0*   HEMATOCRIT  --  22.7*  --   --   --  20.6*  --   --  23.3*   PLATELETCT  --  101*  --   --   --  102*  --   --  97*   PROTHROMBTM 32.5* 21.7*  --  22.9*  --   --  20.7*  --  20.9*   APTT 43.0*  --   --   --   --   --   --   --   --    INR 3.14* 1.88*  --  2.01*  --   --  1.77*  --  1.79*    < > = values in this interval not displayed.     Recent Results (from the past 24 hours)   LACTIC ACID    Collection Time: 05/19/25 12:32 PM   Result Value Ref Range    Lactic Acid 3.5 (H) 0.5 - 2.0 mmol/L   Prothrombin time (INR)     Collection Time: 05/19/25 12:32 PM   Result Value Ref Range    PT 20.7 (H) 12.0 - 14.6 sec    INR 1.77 (H) 0.87 - 1.13   Blood Culture    Collection Time: 05/19/25 12:32 PM    Specimen: Peripheral; Blood   Result Value Ref Range    Significant Indicator NEG     Source BLD     Site PERIPHERAL     Culture Result       No Growth  Note: Blood cultures are incubated for 5 days and  are monitored continuously.Positive blood cultures  are called to the RN and reported as soon as  they are identified.     Blood Culture    Collection Time: 05/19/25 12:32 PM    Specimen: Peripheral; Blood   Result Value Ref Range    Significant Indicator NEG     Source BLD     Site Peripheral     Culture Result       No Growth  Note: Blood cultures are incubated for 5 days and  are monitored continuously.Positive blood cultures  are called to the RN and reported as soon as  they are identified.     Urinalysis    Collection Time: 05/19/25 12:32 PM    Specimen: Urine, Nicole Cath   Result Value Ref Range    Color Dark Yellow     Character Clear     Specific Gravity 1.031 <1.035    Ph 5.0 5.0 - 8.0    Glucose Negative Negative mg/dL    Ketones Negative Negative mg/dL    Protein Negative Negative mg/dL    Bilirubin Negative Negative    Urobilinogen, Urine 1.0 <=1.0 EU/dL    Nitrite Negative Negative    Leukocyte Esterase Small (A) Negative    Occult Blood Negative Negative    Micro Urine Req Microscopic    AMMONIA    Collection Time: 05/19/25 12:32 PM   Result Value Ref Range    Ammonia 31 11 - 45 umol/L   URINE MICROSCOPIC (W/UA)    Collection Time: 05/19/25 12:32 PM   Result Value Ref Range    WBC 11-20 (A) /hpf    RBC 3-5 (A) 0 - 2 /hpf    Bacteria None Seen None /hpf    Epithelial Cells 0-2 0 - 5 /hpf    Urine Casts 0-2 0 - 2 /lpf   POCT glucose device results    Collection Time: 05/19/25 12:41 PM   Result Value Ref Range    POC Glucose, Blood 123 (H) 65 - 99 mg/dL   LACTIC ACID    Collection Time: 05/19/25  4:00 PM   Result Value Ref Range     Lactic Acid 3.4 (H) 0.5 - 2.0 mmol/L   HGB    Collection Time: 05/19/25  4:00 PM   Result Value Ref Range    Hemoglobin 8.5 (L) 14.0 - 18.0 g/dL   POCT glucose device results    Collection Time: 05/19/25  6:37 PM   Result Value Ref Range    POC Glucose, Blood 132 (H) 65 - 99 mg/dL   LACTIC ACID    Collection Time: 05/19/25  8:57 PM   Result Value Ref Range    Lactic Acid 2.5 (H) 0.5 - 2.0 mmol/L   Procalcitonin    Collection Time: 05/19/25  8:57 PM   Result Value Ref Range    Procalcitonin 0.69 (H) <0.25 ng/mL   POCT glucose device results    Collection Time: 05/19/25 11:40 PM   Result Value Ref Range    POC Glucose, Blood 102 (H) 65 - 99 mg/dL   AMMONIA    Collection Time: 05/20/25  5:03 AM   Result Value Ref Range    Ammonia 53 (H) 11 - 45 umol/L   CBC with Differential: Tomorrow AM    Collection Time: 05/20/25  5:03 AM   Result Value Ref Range    WBC 15.6 (H) 4.8 - 10.8 K/uL    RBC 2.30 (L) 4.70 - 6.10 M/uL    Hemoglobin 8.0 (L) 14.0 - 18.0 g/dL    Hematocrit 23.3 (L) 42.0 - 52.0 %    .3 (H) 81.4 - 97.8 fL    MCH 34.8 (H) 27.0 - 33.0 pg    MCHC 34.3 32.3 - 36.5 g/dL    RDW 76.3 (H) 35.9 - 50.0 fL    Platelet Count 97 (L) 164 - 446 K/uL    MPV 10.3 9.0 - 12.9 fL    Neutrophils-Polys 90.40 (H) 44.00 - 72.00 %    Lymphocytes 6.10 (L) 22.00 - 41.00 %    Monocytes 3.50 0.00 - 13.40 %    Eosinophils 0.00 0.00 - 6.90 %    Basophils 0.00 0.00 - 1.80 %    Nucleated RBC 0.20 0.00 - 0.20 /100 WBC    Neutrophils (Absolute) 14.10 (H) 1.82 - 7.42 K/uL    Lymphs (Absolute) 0.95 (L) 1.00 - 4.80 K/uL    Monos (Absolute) 0.60 0.00 - 0.85 K/uL    Eos (Absolute) 0.00 0.00 - 0.51 K/uL    Baso (Absolute) 0.00 0.00 - 0.12 K/uL    NRBC (Absolute) 0.03 K/uL    Anisocytosis 1+     Macrocytosis 1+    Comp Metabolic Panel (CMP): Tomorrow AM    Collection Time: 05/20/25  5:03 AM   Result Value Ref Range    Sodium 135 135 - 145 mmol/L    Potassium 4.4 3.6 - 5.5 mmol/L    Chloride 110 96 - 112 mmol/L    Co2 17 (L) 20 - 33 mmol/L     Anion Gap 8.0 7.0 - 16.0    Glucose 97 65 - 99 mg/dL    Bun 46 (H) 8 - 22 mg/dL    Creatinine 1.69 (H) 0.50 - 1.40 mg/dL    Calcium 8.2 (L) 8.5 - 10.5 mg/dL    Correct Calcium 9.2 8.5 - 10.5 mg/dL    AST(SGOT) 54 (H) 12 - 45 U/L    ALT(SGPT) 23 2 - 50 U/L    Alkaline Phosphatase 61 30 - 99 U/L    Total Bilirubin 2.2 (H) 0.1 - 1.5 mg/dL    Albumin 2.7 (L) 3.2 - 4.9 g/dL    Total Protein 5.1 (L) 6.0 - 8.2 g/dL    Globulin 2.4 1.9 - 3.5 g/dL    A-G Ratio 1.1 g/dL   Prothrombin Time    Collection Time: 05/20/25  5:03 AM   Result Value Ref Range    PT 20.9 (H) 12.0 - 14.6 sec    INR 1.79 (H) 0.87 - 1.13   ESTIMATED GFR    Collection Time: 05/20/25  5:03 AM   Result Value Ref Range    GFR (CKD-EPI) 42 (A) >60 mL/min/1.73 m 2   DIFFERENTIAL MANUAL    Collection Time: 05/20/25  5:03 AM   Result Value Ref Range    Manual Diff Status PERFORMED    PERIPHERAL SMEAR REVIEW    Collection Time: 05/20/25  5:03 AM   Result Value Ref Range    Peripheral Smear Review see below    PLATELET ESTIMATE    Collection Time: 05/20/25  5:03 AM   Result Value Ref Range    Plt Estimation Decreased    MORPHOLOGY    Collection Time: 05/20/25  5:03 AM   Result Value Ref Range    RBC Morphology Present     Poikilocytosis 1+     Echinocytes 1+    IMMATURE PLT FRACTION    Collection Time: 05/20/25  5:03 AM   Result Value Ref Range    Imm. Plt Fraction 1.2 0.6 - 13.1 %   POCT glucose device results    Collection Time: 05/20/25  6:07 AM   Result Value Ref Range    POC Glucose, Blood 95 65 - 99 mg/dL       Radiology Review:  DX-PELVIS-TRAUMA SERIES  3-   Final Result      Right acetabular fracture redemonstrated with grossly unchanged alignment.      DX-CHEST-PORTABLE (1 VIEW)   Final Result      1.  Subsegmental atelectasis in the left lower lobe.   2.  Atherosclerosis.   3.  The remainder of the chest radiography is within normal limits.      US-TRAUMA VEIN SCREEN LOWER BILAT EXTREMITY   Final Result      IR-EMBOLIZATION   Final Result      1.  Pelvic  angiogram demonstrating no evidence of active extravasation, pseudoaneurysm or truncated vessel.   2.  Empiric Gelfoam embolization of the right internal iliac artery anterior division.   3.  Right common femoral arteriogram demonstrating appropriate access over the femoral head.   4.  Left internal iliac artery angiogram demonstrating no evidence of active extravasation, pseudoaneurysm or truncated vessel. Empiric embolization was not performed on the left due to lack of conventional angiographic findings which correlates with    recent CT findings demonstrating no evidence of injury in this region.         CT-CSPINE WITHOUT PLUS RECONS   Final Result      No acute fracture is identified.      DX-PELVIS-1 OR 2 VIEWS   Final Result      Right acetabular fracture.      US-PARACENTESIS, ABD WITH IMAGING    (Results Pending)   DX-CHEST-PORTABLE (1 VIEW)    (Results Pending)         MDM (Data Review):   -Records reviewed and summarized in current documentation  -I personally reviewed and interpreted the laboratory results  -I personally reviewed the radiology images    Assessment/Recommendations:  Anemia secondary to pelvic fracture and acute blood loss hematochezia  Decompensated alcoholic cirrhosis with ascites  Leukocytosis   Thrombocytopenia  Lactic acidosis  Acute kidney injury  Elevated AST and T. Bili   Hypoalbuminemia  Chronic non-occlusive thrombus in the RLE femoral vein  Hyperbilirubinemia   Elevated INR  Hepatic encephalopathy with asterixis and lethargy  CT reveals cholelithiasis, colonic diverticula, umbilical hernia, mild ascites  Fluid collection adjacent to the right acetabular fracture hematoma vs abscess  Left inguinal hernia with ascites and possible hemorrhage     MELD 3.0 - 24     Plan:   Await biopsy - GI team will notify patient of results  Monitor for further bleeding  Ensure adequate blood pressures  OK to restart diuretics  Patient takes lactulose as needed at home to avoid encephalopathy, his  daughter is very well versed in caring for him.   Anticipate discharge to rehab  Follows with [facility], recommended they make an appointment for routine follow-up     [facility] will sign off. Please reconsult with any questions or concerns    Discussed with patient and his family, Dr. Walsh, Dr. Simmons    ..Lilibeth Ortiz, JUANA,  APRN    Addendum: Pathology consistent with ischemic colitis  Recommend repeat colonoscopy in one year   Notified Dr. Walsh    Core Quality Measures   Reviewed items::  Labs, Medications and Radiology reports reviewed

## 2025-05-20 NOTE — CARE PLAN
The patient is Stable - Low risk of patient condition declining or worsening    Shift Goals  Clinical Goals: monitor labs, I&O, paracentesis, possible egd  Patient Goals: poc, rest  Family Goals: poc, updatess    Progress made toward(s) clinical / shift goals:    Problem: Fall Risk  Goal: Patient will remain free from falls  Description: Target End Date:  Prior to discharge or change in level of careDocument interventions on the [facility] Fall Risk Assessment1.  Assess for fall risk factors2.  Implement fall precautions  Outcome: Progressing     Problem: Respiratory  Goal: Patient will achieve/maintain optimum respiratory ventilation and gas exchange  Description: Target End Date:  Prior to discharge or change in level of careDocument on Assessment flowsheet1.  Assess and monitor rate, rhythm, depth and effort of respiration2.  Breath sounds assessed qshift and/or as needed3.  Assess O2 saturation, administer/titrate oxygen as ordered4.  Position patient for maximum ventilatory efficiency5.  Turn, cough, and deep breath with splinting to improve effectiveness6.  Collaborate with RT to administer medication/treatments per order7.  Encourage use of incentive spirometer and encourage patient to cough after use and utilize splinting techniques if applicable8.  Airway suctioning9.  Monitor sputum production for changes in color, consistency and [id number]. Perform frequent oral [id number]. Alternate physical activity with rest periods  Outcome: Progressing       Patient is not progressing towards the following goals:

## 2025-05-20 NOTE — DOCUMENTATION QUERY
[facility]                                                                       Query Response Note      PATIENT:               FABIAN LIRIAON  ACCT #:                  [id number]  MRN:                     [id number]  :                      1950  ADMIT DATE:       2025 8:14 PM  DISCH DATE:          RESPONDING  PROVIDER #:        [id number]           QUERY TEXT:    Based on the findings above and further workup please specify if there is a correlating diagnosis.    The patient's Clinical Indicators include:  Findings:  HP transfer after syncope/ground level fall He is found to have a right acetabular fracture and active pelvic extrav. hypovolemic shock      PN: hematemesis w/ nausea coagulopathy      PN: Hematemesis with nausea Reported dark, coffee ground emesis. History of liver cirrhosis.protonix BID.     Consult: Anemia due to blood loss Underwent embolization by IR for intrapelvic bleeding  Patient developed lower GI bleeding    Labs:  hgb 8.4   hgb 6.6-7.6   hgb 7.0    Treatment: transfusion labs, gelfoam embolization protonix     Risk Factors: pelvic hematoma, acetabular fracture w/ pelvic hemorrhage, hematemesis    Heaven Bill RN ADIELN  Ab[place]elsada,  Clinical Documentation   [email or web address]  Connect via Vo[employer] Messenger    Note: If you agree with a diagnosis listed above, please remember to include it in your concurrent daily documentation and onto the Discharge Summary.  Options provided:   -- Blood loss anemia, acute   -- Other explanation, (please specify other explanation)      Query created by: Heaven Bill on 2025 11:57 AM    RESPONSE TEXT:    Blood loss anemia, acute          Electronically signed by:  MARY COTA MD 2025 7:05 AM

## 2025-05-20 NOTE — CARE PLAN
The patient is Watcher - Medium risk of patient condition declining or worsening    Shift Goals  Clinical Goals: hemodymic stabiltiy, trend Hgb  Patient Goals: rest  Family Goals: no family present    Progress made toward(s) clinical / shift goals:      Problem: Knowledge Deficit - Standard  Goal: Patient and family/care givers will demonstrate understanding of plan of care, disease process/condition, diagnostic tests and medications  Outcome: Progressing     Problem: Pain - Standard  Goal: Alleviation of pain or a reduction in pain to the patient’s comfort goal  Outcome: Progressing     Problem: Skin Integrity  Goal: Skin integrity is maintained or improved  Outcome: Progressing

## 2025-05-20 NOTE — DISCHARGE PLANNING
0904  DPA sent [facility] blanket Artemio/Awad area.    1407  DPA sent Rehab referral to [facility]

## 2025-05-20 NOTE — THERAPY
Physical Therapy   Initial Evaluation     Patient Name: Evelio Pressley  Age:  74 y.o., Sex:  male  Medical Record #: [id number]  Today's Date: 5/20/2025     Precautions  Precautions: Fall Risk;Non Weight Bearing Right Lower Extremity;Other (See Comments)  Comments: ROMAT RLE; HOB>30 degrees    Assessment  Patient is 74 y.o. male who presented on 5/17/2025 after syncope and a fall. Transferred from [facility], per chart review he had multiple bouts of dark emesis. He was found to have a right acetabular fracture with associated hemorrhage in the pelvis and lower abdomen extending into the hip joint. 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  IR embolized right iliac artery on 5/17.    Currently being treated for: anemia due to blood loss, fall, GI bleeding, closed fracture of R acetabulum    PMH: decompensated alcoholic cirrhosis, ascites, benign prostatic hyperplasia with urinary frequency, thrombocytopenia, hypovolemic shock, lactic acid acidosis, leukocytosis, acute kidney injury on chronic kidney disease    Patient seen for PT evaluation and treatment. Patient in bed, agreeable for the session with encouragement. Patient able to participate with functional mobility as detailed below. Currently appears to be below baseline level of functional mobility. Will continue to benefit from PT services to help improve functional mobility, facilitate return to prior level of function and ensure safe DC home. Recommend post-acute placement with family supervision/assistance as needed.     Recommend nursing to encourage OOB to chair for meals.       Plan    Physical Therapy Initial Treatment Plan   Treatment Plan : Bed Mobility, Equipment, Neuro Re-Education / Balance, Therapeutic Exercise, Therapeutic Activities, Family / Caregiver Training  Treatment Frequency: 4 Times per Week  Duration: Until Therapy Goals Met    DC Equipment Recommendations: None (Owns FWW, WC, scooter)  Discharge  Recommendations: Recommend post-acute placement for additional physical therapy services prior to discharge home       Objective     05/20/25 1059   Time In/Time Out   Therapy Start Time 1009   Therapy End Time 1059   Total Therapy Time 50   Initial Contact Note    Initial Contact Note Order Received and Verified, Physical Therapy Evaluation in Progress with Full Report to Follow.   Precautions   Precautions Fall Risk;Non Weight Bearing Right Lower Extremity;Other (See Comments)   Comments ROMAT RLE; HOB>30 degrees   Vitals   Pulse 88   Patient BP Position Sitting  (EOB)   Blood Pressure  124/57   Pulse Oximetry 92 %   O2 (LPM) 1   O2 Delivery Device Silicone Nasal Cannula   Vitals Comments Post activity: Seated in chair: /65, HR 84, SPO2 95%   Pain   Pain Scales 0 to 10 Scale    Intervention Repositioned;Rest   Pain 0 - 10 Group   Therapist Pain Assessment Prior to Activity;During Activity;Post Activity;Post Activity Pain Same as Prior to Activity   Prior Living Situation   Prior Services Home-Independent   Housing / Facility [address]   Steps Into Home 0  (1 step going through the garage or in the back, but daughter states they always use the front entrance with no steps.)   Steps In Home 0   Equipment Owned Wheelchair;Single Point Cane;Quad Cane;Front-Wheel Walker;Scooter   Lives with - Patient's Self Care Capacity Adult Children  (Daughter)   Comments Pt lives with his daughter and her aunt lives across the street.   Prior Level of Functional Mobility   Bed Mobility Independent   Transfer Status Independent   Ambulation Independent   Ambulation Distance Limited community   Assistive Devices Used Single Point Cane   History of Falls   History of Falls Yes   Date of Last Fall   (prior to admission, dog pulled him down)   Cognition    Cognition / Consciousness X   Speech/ Communication Hard of Hearing   Level of Consciousness Alert   Active ROM Lower Body    Active ROM Lower Body  X   Comments Limited R  hip flexion and knee extension and ankle DF   Strength Lower Body   Lower Body Strength  X   Comments Impaired R hip flexors, knee extensors, ankle DF (grossly 3/5). R knee flexors 3+/5. LLE grossly 4/5.   Sensation Lower Body   Lower Extremity Sensation   X   Comments Noted the LLE had more sensation in the medial and lateral thigh than the RLE with light touch.   Lower Body Muscle Tone   Lower Body Muscle Tone  WDL   Vision   Vision Comments Wears reading glasses   Other Treatments   Other Treatments Provided Educated pt on use of AD when walking, activity pacing and continued mobility, sitting in chair for meals and increasing mobility, and transfering to bedside commode. Educated pt on his NWB precautions for his RLE and importance of keeping weight off the leg during transfers. Discussed discharge plan with pt and his daughter, they are willing to try rehab if discharged before he is able to build up strength and transfer to wheelchair without weight bearing through his RLE. Pt's daughter would prefer acute rehab over SNF.   Balance Assessment   Sitting Balance (Static) Poor +   Sitting Balance (Dynamic) Poor +   Standing Balance (Static) Poor   Standing Balance (Dynamic) Trace +   Weight Shift Sitting Fair   Weight Shift Standing Absent  (RLE-NWB)   Comments Seated EOB, Standing w/ FWW   Bed Mobility    Supine to Sit Moderate Assist   Scooting Minimal Assist   Rolling Moderate Assist to Lt.   Comments HOB 30 degrees. Cueing for rolling, use of hand rails and elbows on bed to push himself up to seated.   Gait Analysis   Gait Level Of Assist Unable to Participate   Comments Increased difficulty to maintain R foot off the ground, per patient he prefers to keep R foot on the ground for balance, but assures that he is not putting weight on it. Patient appears to be putting minimal weight on RLE on standing, hence ambulation deferred at this time.   Functional Mobility   Sit to Stand Minimal Assist   Bed, Chair,  Wheelchair Transfer Minimal Assist   Transfer Method Squat Pivot   Mobility supine-EOB-standing w/ FWW-seated EOB-squat pivot transfer-chair   Comments W/ FWW. Cueing for hand placement to push off of surface and reach back before sitting; Cueing for maintaining RLE off the ground and increasing WB through B/L UE.   6 Clicks Assessment - How much HELP from from another person do you currently need... (If the patient hasn't done an activity recently, how much help from another person do you think he/she would need if he/she tried?)   Turning from your back to your side while in a flat bed without using bedrails? 2   Moving from lying on your back to sitting on the side of a flat bed without using bedrails? 2   Moving to and from a bed to a chair (including a wheelchair)? 3   Standing up from a chair using your arms (e.g., wheelchair, or bedside chair)? 3   Walking in hospital room? 1   Climbing 3-5 steps with a railing? 1   6 clicks Mobility Score 12   Activity Tolerance   Sitting in Chair end of session   Patient / Family Goals    Patient / Family Goal #1 Return home   Short Term Goals    Short Term Goal # 1 Pt will be able to demonstrate WC mobility and management with supervision to return home following NWB precautions for RLE within 6 visits.   Short Term Goal # 2 Pt will be able to perform supine to sit, sit to supine with supervision to safely get in & out of bed within 6 visits.   Short Term Goal # 3 Pt will be able to complete sit to stand with FWW with supervision while maintaining NWB on RLE to return home within 6 visits.   Short Term Goal # 4 Patient will perform wheel chair transfers with LRAD while maintaining NWB on RLE with supervision in 6 visits to safely get OOB to chair   Education Group   Education Provided Role of Physical Therapist   Role of Physical Therapist Patient Response Patient;Acceptance;Explanation;Verbal Demonstration;Family   Physical Therapy Initial Treatment Plan    Treatment Plan   Bed Mobility;Equipment;Neuro Re-Education / Balance;Therapeutic Exercise;Therapeutic Activities;Family / Caregiver Training   Treatment Frequency 4 Times per Week   Duration Until Therapy Goals Met   Problem List    Problems Impaired Bed Mobility;Impaired Transfers;Impaired Ambulation;Functional Strength Deficit;Impaired Balance;Decreased Activity Tolerance;Limited Knowledge of Post-Op Precautions   Anticipated Discharge Equipment and Recommendations   DC Equipment Recommendations None  (Owns FWW, WC, scooter)   Discharge Recommendations Recommend post-acute placement for additional physical therapy services prior to discharge home   Interdisciplinary Plan of Care Collaboration   IDT Collaboration with  Nursing;;Family / Caregiver   Patient Position at End of Therapy Tray Table within Reach;Call Light within Reach;Seated;Chair Alarm On;Family / Friend in Room   Session Information   Date / Session Number  5/20- 1(1/4, 5/26)   Priority   (RLE-NWB-Non op Mgmt)

## 2025-05-20 NOTE — PROGRESS NOTES
Monitor tech called and notified this RN that pt had 8 beats of VTACH. Pt asymptomatic and denies chest pain, nausea, SOB, and dizziness. Other VSS per flowsheets. MD notified. No new orders at this time.

## 2025-05-20 NOTE — DISCHARGE PLANNING
Received notification from MD that pt will require post acute placement, SNF referrals placed, referral sent to [facility].      Received notification from physical therapist that recommendation is for post acute placement and pt's daughter prefers acute rehab placement first, is declining SNF placement, and prefers HH if not accepted by acute rehab facility.      Pt discussed with IDT, pt pending OT recommendations and is planned for colonoscopy tomorrow 5/21.      Spoke with Jessica at [facility] who confirmed will review pt tomorrow and contact CM or DPA regarding determination.

## 2025-05-20 NOTE — CARE PLAN
The patient is Stable - Low risk of patient condition declining or worsening    Shift Goals  Clinical Goals: monitor and manage Hgb, monitor voiding, hemodynamic stability, tele monitoring, safety, skin integrity  Patient Goals: sleep  Family Goals: POC    Progress made toward(s) clinical / shift goals:        Problem: Skin Integrity  Goal: Skin integrity is maintained or improved  Description: Target End Date:  Prior to discharge or change in level of careDocument interventions on Skin Risk/Rupesh flowsheet groups and corresponding LDA1.  Assess and monitor skin integrity, appearance and/or temperature2.  Assess risk factors for impaired skin integrity and/or pressures ulcers3.  Implement precautions to protect skin integrity in collaboration with interdisciplinary team4.  Implement pressure ulcer prevention protocol if at risk for skin breakdown5.  Confirm wound care consult if at risk for skin breakdown6.  Ensure patient use of pressure relieving devices  (Low air loss bed, waffle overlay, heel protectors, ROHO cushion, etc)  Outcome: Progressing  Note: Pt skin integrity maintained this shift. Pt compliant with Q2 hour turns. GRIS mattress, heel offloading dressings, pillows to float heels in place.       Patient is not progressing towards the following goals:      Problem: Bowel Elimination  Goal: Establish and maintain regular bowel function  Description: Target End Date:  Prior to discharge or change in level of care1.   Note date of last BM2.   Educate about diet, fluid intake, medication and activity to promote bowel function3.   Educate signs and symptoms of constipation and interventions to implement4.   Pharmacologic bowel management per provider order5.   Regular toileting schedule6.   Upright position for toileting7.   High fiber diet8.   Encourage hydration9.   Collaborate with Clinical [id number]. Care and maintenance of ostomy if applicable  Outcome: Not Progressing  Note: Pt experienced 3 loose  stools with bright red blood this shift. MD notified. Pt denied dizziness, pain, and SOB.       Family

## 2025-05-20 NOTE — PROGRESS NOTES
4 Eyes Skin Assessment Completed by DAVION Rodriguez and DAVION Davey.    Head WDL  Ears Redness and Blanching  Nose Redness and Scab abrasion  Mouth WDL  Neck WDL  Breast/Chest WDL  Shoulder Blades WDL  Spine WDL  (R) Arm/Elbow/Hand WDL  (L) Arm/Elbow/Hand WDL  Abdomen WDL umbilical hernia  Groin Bruising s/p sheath removal  Scrotum/Coccyx/Buttocks Excoriation and Moisture Fissure    (R) Leg WDL  (L) Leg WDL  (R) Heel/Foot/Toe Redness, Blanching, and Boggy  (L) Heel/Foot/Toe Redness, Blanching, and Boggy          Devices In Places Tele Box, Blood Pressure Cuff, Pulse Ox, Condom Cath, and Nasal Cannula      Interventions In Place Gray Ear Foams, TAP System, Pillows, Q2 Turns, Low Air Loss Mattress, Barrier Cream, and Heels Loaded W/Pillows    Possible Skin Injury Yes    Pictures Uploaded Into Epic Yes  Wound Consult Placed N/A  RN Wound Prevention Protocol Ordered Yes

## 2025-05-20 NOTE — PROGRESS NOTES
Received patient from TICU and report from Jason RICARDO. Son at bedside. On 1L of O2 via NC. No acute distress.

## 2025-05-21 ENCOUNTER — ANESTHESIA (OUTPATIENT)
Dept: SURGERY | Facility: MEDICAL CENTER | Age: 75
DRG: 270 | End: 2025-05-21
Payer: MEDICARE

## 2025-05-21 ENCOUNTER — APPOINTMENT (OUTPATIENT)
Dept: RADIOLOGY | Facility: MEDICAL CENTER | Age: 75
DRG: 270 | End: 2025-05-21
Attending: STUDENT IN AN ORGANIZED HEALTH CARE EDUCATION/TRAINING PROGRAM
Payer: MEDICARE

## 2025-05-21 ENCOUNTER — ANESTHESIA EVENT (OUTPATIENT)
Dept: SURGERY | Facility: MEDICAL CENTER | Age: 75
DRG: 270 | End: 2025-05-21
Payer: MEDICARE

## 2025-05-21 ENCOUNTER — APPOINTMENT (OUTPATIENT)
Dept: RADIOLOGY | Facility: MEDICAL CENTER | Age: 75
DRG: 270 | End: 2025-05-21
Attending: PHYSICIAN ASSISTANT
Payer: MEDICARE

## 2025-05-21 LAB
ALBUMIN SERPL BCP-MCNC: 2.6 G/DL (ref 3.2–4.9)
ALBUMIN/GLOB SERPL: 1.1 G/DL
ALP SERPL-CCNC: 72 U/L (ref 30–99)
ALT SERPL-CCNC: 21 U/L (ref 2–50)
ANION GAP SERPL CALC-SCNC: 9 MMOL/L (ref 7–16)
ANISOCYTOSIS BLD QL SMEAR: ABNORMAL
AST SERPL-CCNC: 45 U/L (ref 12–45)
BASOPHILS # BLD AUTO: 0 % (ref 0–1.8)
BASOPHILS # BLD: 0 K/UL (ref 0–0.12)
BILIRUB SERPL-MCNC: 1.9 MG/DL (ref 0.1–1.5)
BUN SERPL-MCNC: 37 MG/DL (ref 8–22)
CALCIUM ALBUM COR SERPL-MCNC: 9.3 MG/DL (ref 8.5–10.5)
CALCIUM SERPL-MCNC: 8.2 MG/DL (ref 8.5–10.5)
CHLORIDE SERPL-SCNC: 110 MMOL/L (ref 96–112)
CO2 SERPL-SCNC: 18 MMOL/L (ref 20–33)
CREAT SERPL-MCNC: 1.4 MG/DL (ref 0.5–1.4)
EOSINOPHIL # BLD AUTO: 0.22 K/UL (ref 0–0.51)
EOSINOPHIL NFR BLD: 1.7 % (ref 0–6.9)
ERYTHROCYTE [DISTWIDTH] IN BLOOD BY AUTOMATED COUNT: 72.2 FL (ref 35.9–50)
ERYTHROCYTE [DISTWIDTH] IN BLOOD BY AUTOMATED COUNT: 74.5 FL (ref 35.9–50)
GFR SERPLBLD CREATININE-BSD FMLA CKD-EPI: 52 ML/MIN/1.73 M 2
GLOBULIN SER CALC-MCNC: 2.4 G/DL (ref 1.9–3.5)
GLUCOSE BLD STRIP.AUTO-MCNC: 78 MG/DL (ref 65–99)
GLUCOSE BLD STRIP.AUTO-MCNC: 87 MG/DL (ref 65–99)
GLUCOSE BLD STRIP.AUTO-MCNC: 89 MG/DL (ref 65–99)
GLUCOSE BLD STRIP.AUTO-MCNC: 93 MG/DL (ref 65–99)
GLUCOSE SERPL-MCNC: 93 MG/DL (ref 65–99)
HCT VFR BLD AUTO: 22.7 % (ref 42–52)
HCT VFR BLD AUTO: 23.1 % (ref 42–52)
HCT VFR BLD AUTO: 25.4 % (ref 42–52)
HCT VFR BLD AUTO: 26.1 % (ref 42–52)
HGB BLD-MCNC: 7.8 G/DL (ref 14–18)
HGB BLD-MCNC: 7.8 G/DL (ref 14–18)
HGB BLD-MCNC: 8.3 G/DL (ref 14–18)
HGB BLD-MCNC: 8.7 G/DL (ref 14–18)
INR PPP: 1.66 (ref 0.87–1.13)
LYMPHOCYTES # BLD AUTO: 0.92 K/UL (ref 1–4.8)
LYMPHOCYTES NFR BLD: 7 % (ref 22–41)
MACROCYTES BLD QL SMEAR: ABNORMAL
MAGNESIUM SERPL-MCNC: 2.4 MG/DL (ref 1.5–2.5)
MANUAL DIFF BLD: NORMAL
MCH RBC QN AUTO: 34.8 PG (ref 27–33)
MCH RBC QN AUTO: 35 PG (ref 27–33)
MCHC RBC AUTO-ENTMCNC: 33.8 G/DL (ref 32.3–36.5)
MCHC RBC AUTO-ENTMCNC: 34.4 G/DL (ref 32.3–36.5)
MCV RBC AUTO: 101.3 FL (ref 81.4–97.8)
MCV RBC AUTO: 103.6 FL (ref 81.4–97.8)
MICROCYTES BLD QL SMEAR: ABNORMAL
MONOCYTES # BLD AUTO: 1 K/UL (ref 0–0.85)
MONOCYTES NFR BLD AUTO: 7.8 % (ref 0–13.4)
MORPHOLOGY BLD-IMP: NORMAL
NEUTROPHILS # BLD AUTO: 11.02 K/UL (ref 1.82–7.42)
NEUTROPHILS NFR BLD: 83.5 % (ref 44–72)
NRBC # BLD AUTO: 0 K/UL
NRBC BLD-RTO: 0 /100 WBC (ref 0–0.2)
PATHOLOGY CONSULT NOTE: NORMAL
PLATELET # BLD AUTO: 86 K/UL (ref 164–446)
PLATELET # BLD AUTO: 90 K/UL (ref 164–446)
PLATELET BLD QL SMEAR: NORMAL
PLATELETS.RETICULATED NFR BLD AUTO: 1.4 % (ref 0.6–13.1)
PLATELETS.RETICULATED NFR BLD AUTO: 1.5 % (ref 0.6–13.1)
PMV BLD AUTO: 10.4 FL (ref 9–12.9)
PMV BLD AUTO: 9.9 FL (ref 9–12.9)
POLYCHROMASIA BLD QL SMEAR: NORMAL
POTASSIUM SERPL-SCNC: 4.3 MMOL/L (ref 3.6–5.5)
PROT SERPL-MCNC: 5 G/DL (ref 6–8.2)
PROTHROMBIN TIME: 19.7 SEC (ref 12–14.6)
RBC # BLD AUTO: 2.23 M/UL (ref 4.7–6.1)
RBC # BLD AUTO: 2.24 M/UL (ref 4.7–6.1)
RBC BLD AUTO: PRESENT
SODIUM SERPL-SCNC: 137 MMOL/L (ref 135–145)
WBC # BLD AUTO: 13.2 K/UL (ref 4.8–10.8)
WBC # BLD AUTO: 13.8 K/UL (ref 4.8–10.8)

## 2025-05-21 PROCEDURE — 36415 COLL VENOUS BLD VENIPUNCTURE: CPT

## 2025-05-21 PROCEDURE — 700111 HCHG RX REV CODE 636 W/ 250 OVERRIDE (IP): Performed by: SURGERY

## 2025-05-21 PROCEDURE — 700117 HCHG RX CONTRAST REV CODE 255: Performed by: STUDENT IN AN ORGANIZED HEALTH CARE EDUCATION/TRAINING PROGRAM

## 2025-05-21 PROCEDURE — 85014 HEMATOCRIT: CPT

## 2025-05-21 PROCEDURE — 85007 BL SMEAR W/DIFF WBC COUNT: CPT

## 2025-05-21 PROCEDURE — 160002 HCHG RECOVERY MINUTES (STAT): Performed by: SPECIALIST

## 2025-05-21 PROCEDURE — 85027 COMPLETE CBC AUTOMATED: CPT

## 2025-05-21 PROCEDURE — 160048 HCHG OR STATISTICAL LEVEL 1-5: Performed by: SPECIALIST

## 2025-05-21 PROCEDURE — 85610 PROTHROMBIN TIME: CPT

## 2025-05-21 PROCEDURE — 82962 GLUCOSE BLOOD TEST: CPT | Mod: 91

## 2025-05-21 PROCEDURE — 88305 TISSUE EXAM BY PATHOLOGIST: CPT | Mod: 26 | Performed by: PATHOLOGY

## 2025-05-21 PROCEDURE — 45380 COLONOSCOPY AND BIOPSY: CPT | Mod: 59 | Performed by: SPECIALIST

## 2025-05-21 PROCEDURE — 700102 HCHG RX REV CODE 250 W/ 637 OVERRIDE(OP): Performed by: STUDENT IN AN ORGANIZED HEALTH CARE EDUCATION/TRAINING PROGRAM

## 2025-05-21 PROCEDURE — 45385 COLONOSCOPY W/LESION REMOVAL: CPT | Performed by: SPECIALIST

## 2025-05-21 PROCEDURE — 700102 HCHG RX REV CODE 250 W/ 637 OVERRIDE(OP): Performed by: PHYSICIAN ASSISTANT

## 2025-05-21 PROCEDURE — 160009 HCHG ANES TIME/MIN: Performed by: SPECIALIST

## 2025-05-21 PROCEDURE — A9270 NON-COVERED ITEM OR SERVICE: HCPCS | Performed by: PHYSICIAN ASSISTANT

## 2025-05-21 PROCEDURE — 88305 TISSUE EXAM BY PATHOLOGIST: CPT | Mod: 59 | Performed by: PATHOLOGY

## 2025-05-21 PROCEDURE — 160035 HCHG PACU - 1ST 60 MINS PHASE I: Performed by: SPECIALIST

## 2025-05-21 PROCEDURE — 71045 X-RAY EXAM CHEST 1 VIEW: CPT

## 2025-05-21 PROCEDURE — 770001 HCHG ROOM/CARE - MED/SURG/GYN PRIV*

## 2025-05-21 PROCEDURE — 80053 COMPREHEN METABOLIC PANEL: CPT

## 2025-05-21 PROCEDURE — 0DBK8ZX EXCISION OF ASCENDING COLON, VIA NATURAL OR ARTIFICIAL OPENING ENDOSCOPIC, DIAGNOSTIC: ICD-10-PCS | Performed by: SPECIALIST

## 2025-05-21 PROCEDURE — 160203 HCHG ENDO MINUTES - 1ST 30 MINS LEVEL 4: Performed by: SPECIALIST

## 2025-05-21 PROCEDURE — 700105 HCHG RX REV CODE 258: Performed by: SURGERY

## 2025-05-21 PROCEDURE — 160208 HCHG ENDO MINUTES - EA ADDL 1 MIN LEVEL 4: Performed by: SPECIALIST

## 2025-05-21 PROCEDURE — 85055 RETICULATED PLATELET ASSAY: CPT

## 2025-05-21 PROCEDURE — 700101 HCHG RX REV CODE 250

## 2025-05-21 PROCEDURE — 700102 HCHG RX REV CODE 250 W/ 637 OVERRIDE(OP)

## 2025-05-21 PROCEDURE — 0DBM8ZZ EXCISION OF DESCENDING COLON, VIA NATURAL OR ARTIFICIAL OPENING ENDOSCOPIC: ICD-10-PCS | Performed by: SPECIALIST

## 2025-05-21 PROCEDURE — A9270 NON-COVERED ITEM OR SERVICE: HCPCS

## 2025-05-21 PROCEDURE — A9270 NON-COVERED ITEM OR SERVICE: HCPCS | Performed by: STUDENT IN AN ORGANIZED HEALTH CARE EDUCATION/TRAINING PROGRAM

## 2025-05-21 PROCEDURE — 85018 HEMOGLOBIN: CPT

## 2025-05-21 PROCEDURE — 700111 HCHG RX REV CODE 636 W/ 250 OVERRIDE (IP): Performed by: ANESTHESIOLOGY

## 2025-05-21 PROCEDURE — 700105 HCHG RX REV CODE 258: Performed by: INTERNAL MEDICINE

## 2025-05-21 PROCEDURE — 160015 HCHG STAT PREOP MINUTES: Performed by: SPECIALIST

## 2025-05-21 PROCEDURE — 110371 HCHG SHELL REV 272: Performed by: SPECIALIST

## 2025-05-21 PROCEDURE — 99233 SBSQ HOSP IP/OBS HIGH 50: CPT | Performed by: STUDENT IN AN ORGANIZED HEALTH CARE EDUCATION/TRAINING PROGRAM

## 2025-05-21 PROCEDURE — 74174 CTA ABD&PLVS W/CONTRAST: CPT

## 2025-05-21 PROCEDURE — 83735 ASSAY OF MAGNESIUM: CPT

## 2025-05-21 PROCEDURE — 0DBN8ZZ EXCISION OF SIGMOID COLON, VIA NATURAL OR ARTIFICIAL OPENING ENDOSCOPIC: ICD-10-PCS | Performed by: SPECIALIST

## 2025-05-21 PROCEDURE — 700111 HCHG RX REV CODE 636 W/ 250 OVERRIDE (IP)

## 2025-05-21 RX ORDER — SPIRONOLACTONE 25 MG/1
50 TABLET ORAL EVERY MORNING
Status: DISCONTINUED | OUTPATIENT
Start: 2025-05-21 | End: 2025-05-23 | Stop reason: HOSPADM

## 2025-05-21 RX ORDER — HYDROCORTISONE ACETATE 25 MG/1
25 SUPPOSITORY RECTAL EVERY 12 HOURS
Status: DISCONTINUED | OUTPATIENT
Start: 2025-05-21 | End: 2025-05-23 | Stop reason: HOSPADM

## 2025-05-21 RX ORDER — HALOPERIDOL 5 MG/ML
1 INJECTION INTRAMUSCULAR
Status: DISCONTINUED | OUTPATIENT
Start: 2025-05-21 | End: 2025-05-21 | Stop reason: HOSPADM

## 2025-05-21 RX ORDER — SODIUM CHLORIDE, SODIUM LACTATE, POTASSIUM CHLORIDE, CALCIUM CHLORIDE 600; 310; 30; 20 MG/100ML; MG/100ML; MG/100ML; MG/100ML
INJECTION, SOLUTION INTRAVENOUS CONTINUOUS
Status: DISCONTINUED | OUTPATIENT
Start: 2025-05-21 | End: 2025-05-21 | Stop reason: HOSPADM

## 2025-05-21 RX ORDER — DIPHENHYDRAMINE HYDROCHLORIDE 50 MG/ML
12.5 INJECTION, SOLUTION INTRAMUSCULAR; INTRAVENOUS
Status: DISCONTINUED | OUTPATIENT
Start: 2025-05-21 | End: 2025-05-21 | Stop reason: HOSPADM

## 2025-05-21 RX ORDER — ONDANSETRON 2 MG/ML
4 INJECTION INTRAMUSCULAR; INTRAVENOUS
Status: DISCONTINUED | OUTPATIENT
Start: 2025-05-21 | End: 2025-05-21 | Stop reason: HOSPADM

## 2025-05-21 RX ADMIN — SPIRONOLACTONE 50 MG: 50 TABLET ORAL at 05:48

## 2025-05-21 RX ADMIN — HYDROCORTISONE ACETATE 25 MG: 25 SUPPOSITORY RECTAL at 18:33

## 2025-05-21 RX ADMIN — GABAPENTIN 100 MG: 100 CAPSULE ORAL at 05:48

## 2025-05-21 RX ADMIN — GABAPENTIN 100 MG: 100 CAPSULE ORAL at 15:49

## 2025-05-21 RX ADMIN — PROPOFOL 250 MG: 10 INJECTION, EMULSION INTRAVENOUS at 10:47

## 2025-05-21 RX ADMIN — METAXALONE 400 MG: 800 TABLET ORAL at 18:33

## 2025-05-21 RX ADMIN — IOHEXOL 100 ML: 350 INJECTION, SOLUTION INTRAVENOUS at 21:39

## 2025-05-21 RX ADMIN — DOCUSATE SODIUM 100 MG: 100 CAPSULE, LIQUID FILLED ORAL at 05:48

## 2025-05-21 RX ADMIN — SENNOSIDES, DOCUSATE SODIUM 1 TABLET: 50; 8.6 TABLET, FILM COATED ORAL at 22:01

## 2025-05-21 RX ADMIN — PANTOPRAZOLE SODIUM 40 MG: 40 INJECTION, POWDER, FOR SOLUTION INTRAVENOUS at 05:48

## 2025-05-21 RX ADMIN — SODIUM CHLORIDE: 9 INJECTION, SOLUTION INTRAVENOUS at 04:38

## 2025-05-21 RX ADMIN — PANTOPRAZOLE SODIUM 40 MG: 40 INJECTION, POWDER, FOR SOLUTION INTRAVENOUS at 18:32

## 2025-05-21 RX ADMIN — CEFTRIAXONE SODIUM 2000 MG: 10 INJECTION, POWDER, FOR SOLUTION INTRAVENOUS at 05:48

## 2025-05-21 RX ADMIN — POLYETHYLENE GLYCOL 3350 1 PACKET: 17 POWDER, FOR SOLUTION ORAL at 06:00

## 2025-05-21 RX ADMIN — LIDOCAINE 1 PATCH: 4 PATCH TOPICAL at 22:02

## 2025-05-21 RX ADMIN — METAXALONE 400 MG: 800 TABLET ORAL at 05:49

## 2025-05-21 RX ADMIN — GABAPENTIN 100 MG: 100 CAPSULE ORAL at 22:02

## 2025-05-21 ASSESSMENT — ENCOUNTER SYMPTOMS
WEAKNESS: 1
COUGH: 0
HEADACHES: 0
PALPITATIONS: 0
VOMITING: 0
MYALGIAS: 0
NAUSEA: 0
FEVER: 0
BLOOD IN STOOL: 1
ABDOMINAL PAIN: 0
DIZZINESS: 0
SHORTNESS OF BREATH: 0
CHILLS: 0

## 2025-05-21 ASSESSMENT — FIBROSIS 4 INDEX
FIB4 SCORE: 8.45
FIB4 SCORE: 8.07

## 2025-05-21 ASSESSMENT — PAIN DESCRIPTION - PAIN TYPE: TYPE: ACUTE PAIN

## 2025-05-21 NOTE — THERAPY
Occupational Therapy   Initial Evaluation     Patient Name: Evelio Pressley  Age:  74 y.o., Sex:  male  Medical Record #: [id number]  Today's Date: 5/20/2025     Precautions: Fall Risk, Non Weight Bearing Right Lower Extremity  Comments: ROMAT RLE    Assessment    Patient is 74 y.o. male, pleasant and cooperative, admitted following GLF sustained right acetabular fracture and active pelvic extravascular bleeding, IR completed embolization. Non-op acetabular fx per Ortho with non weight bearing and range of motion as tolerated on the right lower extremity. Pt presents to OT eval below his independent baseline for ADLs, will benefit from post-acute placement for strengthening and ADL retraining while maintaining NWB RLE. Acute OT to follow.      Plan    Occupational Therapy Initial Treatment Plan   Treatment Interventions: Self Care / Activities of Daily Living, Therapeutic Exercises, Therapeutic Activity, Adaptive Equipment  Treatment Frequency: 4 Times per Week  Duration: Until Therapy Goals Met    DC Equipment Recommendations: Unable to determine at this time  Discharge Recommendations: Recommend post-acute placement for additional occupational therapy services prior to discharge home      Objective       05/20/25 1705   Prior Living Situation   Prior Services Home-Independent   Housing / Facility 1 [address]   Steps Into Home 0   Steps In Home 0   Bathroom Set up Walk In Shower;Shower Chair;Grab Bars   Equipment Owned Power Wheel Chair;Tub / Shower Seat;Grab Bar(s) In Tub / Shower;Single Point Cane;Front-Wheel Walker   Lives with - Patient's Self Care Capacity Adult Children   Comments lives with daughter   Prior Level of ADL Function   Self Feeding Independent   Grooming / Hygiene Independent   Bathing Independent   Dressing Independent   Toileting Independent   Prior Level of IADL Function   Medication Management Independent   Laundry Independent   Kitchen Mobility Independent   Finances Independent   Home  Management Independent   Shopping Independent   Prior Level Of Mobility Independent With Device in Home   Driving / Transportation Driving Independent   Comments dtr assists with IADLs   Precautions   Precautions Fall Risk;Non Weight Bearing Right Lower Extremity   Comments ROMAT RLE   Pain 0 - 10 Group   Therapist Pain Assessment Post Activity Pain Same as Prior to Activity;Nurse Notified  (no c/o pain)   Cognition    Cognition / Consciousness X   Speech/ Communication Hard of Hearing   Level of Consciousness Alert   Comments pleasant, cooperative   Active ROM Upper Body   Active ROM Upper Body  WDL   Strength Upper Body   Upper Body Strength  X   Gross Strength Generalized Weakness, Equal Bilaterally.    Upper Body Muscle Tone   Upper Body Muscle Tone  WDL   Coordination Upper Body   Coordination WDL   Balance Assessment   Sitting Balance (Static) Poor +   Sitting Balance (Dynamic) Poor +   Standing Balance (Static) Poor   Standing Balance (Dynamic) Trace +   Weight Shift Sitting Fair   Weight Shift Standing Absent   Comments difficulty maintaining NWB RLE   Bed Mobility    Supine to Sit Moderate Assist   Scooting Minimal Assist   Rolling Moderate Assist to Lt.   ADL Assessment   Eating Supervision   Grooming Minimal Assist;Seated   Upper Body Dressing Minimal Assist   Lower Body Dressing Maximal Assist   Toileting Maximal Assist   How much help from another person does the patient currently need...   Putting on and taking off regular lower body clothing? 2   Bathing (including washing, rinsing, and drying)? 2   Toileting, which includes using a toilet, bedpan, or urinal? 2   Putting on and taking off regular upper body clothing? 3   Taking care of personal grooming such as brushing teeth? 3   Eating meals? 3   6 Clicks Daily Activity Score 15   Functional Mobility   Sit to Stand Minimal Assist   Bed, Chair, Wheelchair Transfer Moderate Assist   Transfer Method Stand Pivot   Mobility FWW   Activity Tolerance    Comments limited by pain and WB status   Short Term Goals   Short Term Goal # 1 pt will complete toilet/BSC transfer with Kylah   Short Term Goal # 2 pt will complete toileting hygiene with SPV   Short Term Goal # 3 pt will complete LB dress wtih Kylah and AE PRN   Education Group   Education Provided Role of Occupational Therapist;Activities of Daily Living;Home Safety;Transfers   Role of Occupational Therapist Patient Response Patient;Acceptance;Explanation;Verbal Demonstration   Home Safety Patient Response Patient;Acceptance;Explanation;Verbal Demonstration   Transfers Patient Response Patient;Acceptance;Explanation;Verbal Demonstration   ADL Patient Response Patient;Acceptance;Explanation;Verbal Demonstration   Occupational Therapy Initial Treatment Plan    Treatment Interventions Self Care / Activities of Daily Living;Therapeutic Exercises;Therapeutic Activity;Adaptive Equipment   Treatment Frequency 4 Times per Week   Duration Until Therapy Goals Met   Problem List   Problem List Decreased Homemaking Skills;Decreased Active Daily Living Skills;Decreased Functional Mobility;Decreased Activity Tolerance;Impaired Postural Control / Balance;Limited Knowledge of Post Op Precautions;Impaired Cognitive Function;Decreased Upper Extremity Strength Left;Decreased Upper Extremity Strength Right   Anticipated Discharge Equipment and Recommendations   DC Equipment Recommendations Unable to determine at this time   Discharge Recommendations Recommend post-acute placement for additional occupational therapy services prior to discharge home   Interdisciplinary Plan of Care Collaboration   IDT Collaboration with  Nursing;   Patient Position at End of Therapy Seated;Chair Alarm On;Call Light within Reach;Tray Table within Reach;Phone within Reach;Family / Friend in Room  (son present)

## 2025-05-21 NOTE — PROGRESS NOTES
PATIENT NAME: Evelio Pressley  :   1950  CSN:   [id number]  MRN:  [id number]      CONSULTATION DATE:  2025     PRIMARY CARE PROVIDER:  Pcp Not In Computer               REASON FOR CONSULT:  Bloody stool and anemia     CONSULT REQUESTED BY:  Dr. Maddi Godinez     HISTORY OF PRESENT ILLNESS:  Evelio Pressley is a 74 y.o. male with history of decompensated alcoholic cirrhosis, ascites, originally seen at [facility] the morning of 2025 after syncope and a fall. Per chart review he had multiple bouts of dark emesis. He was found to have a right acetabular fracture with associated hemorrhage in the pelvis and lower abdomen extending into the hip joint. 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 He was evaluated by surgery who recommended no urgent fixation. IR embolized right iliac artery on .  Pending definitive operative reduction and stabilization, remains nonweightbearing RLE.      Gastroenterology consulted for new onset bloody stools today. Hgb on admission 8.4, down to 6.6 and now 7.0 this am. Has received a total of 6 units PRBC since admission. Having multiple watery bowel movements and subsequently had a large bloody movement today.       EKG yesterday with QTC on 515. Leukocytosis persists with WBC 18.7. Lactic acidosis persists. Tachypneic and hypertensive.     Patient seen with son bedside and daughter was on the telephone. He underwent EGD last year which she reports was negative for varices. In her opinion his mental status is improving. However during our interview he kept falling asleep and had asterixis on exam. She admits that she did not see any blood in his bowel movements.     2025: patient is more awake today. He has had three bloody bowel movements. Hemoglobin is stable. Spoke to daughter and discussed encephalopathy and home care. He did not have enough fluid for paracentesis. He is not having pain in abdomen  PAST MEDICAL  HISTORY:  [Past Medical History]    [Past Medical History]  No past medical history on file.     PAST SURGICAL HISTORY:  [Past Surgical History]    [Past Surgical History]  No past surgical history on file.     CURRENT MEDS:  [Current Medications]     [Current Medications]            Current Facility-Administered Medications   Medication Dose Route Frequency Provider Last Rate Last Admin    [Held by provider] furosemide (Lasix) tablet 20 mg  20 mg Oral FRANCESCA Collins P.A.-C.   20 mg at 05/19/25 1242    spironolactone (Aldactone) tablet 50 mg  50 mg Oral QAM Yessi Collins P.A.-C.   50 mg at 05/19/25 1242    cefTRIAXone (Rocephin) syringe 2,000 mg  2,000 mg Intravenous Q24HRS Clark Miramontes M.D.   2,000 mg at 05/19/25 1243    NS infusion   Intravenous Continuous Maddi Godinez M.D.        [Held by provider] lactulose 20 GM/30ML solution 30 mL  30 mL Oral BID Maddi Godinez M.D.   30 mL at 05/19/25 0610    Respiratory Therapy Consult   Nebulization Continuous RT Linsey M Wegener, A.P.R.N.        docusate sodium (Colace) capsule 100 mg  100 mg Oral BID Linsey M Wegener, A.P.R.N.   100 mg at 05/18/25 1740    senna-docusate (Pericolace Or Senokot S) 8.6-50 MG per tablet 1 Tablet  1 Tablet Oral Nightly Linsey M Wegener, A.P.R.N.   1 Tablet at 05/18/25 2151    senna-docusate (Pericolace Or Senokot S) 8.6-50 MG per tablet 1 Tablet  1 Tablet Oral Q24HRS PRN Linsey M Wegener, A.P.R.N.        polyethylene glycol/lytes (Miralax) Packet 1 Packet  1 Packet Oral BID Linsey M Wegener, A.P.R.N.   1 Packet at 05/18/25 1740    magnesium hydroxide (Milk Of Magnesia) suspension 30 mL  30 mL Oral DAILY AT 1800 Linsey M Wegener, A.P.R.N.   30 mL at 05/18/25 1740    bisacodyl (Dulcolax) suppository 10 mg  10 mg Rectal Q24HRS PRN Linsey M Wegener, A.P.R.N.        sodium phosphate enema 1 Enema  1 Enema Rectal Once PRN Linsey M Wegener, A.P.R.N.        oxyCODONE immediate-release (Roxicodone) tablet 2.5 mg  2.5 mg Oral  Q3HRS PRN Linsey M Wegener, A.P.R.N.         Or    oxyCODONE immediate-release (Roxicodone) tablet 5 mg  5 mg Oral Q3HRS PRN Linsey M Wegener, A.P.R.N.         Or    HYDROmorphone (Dilaudid) injection 0.25 mg  0.25 mg Intravenous Q3HRS PRN Linsey M Wegener, A.P.R.N.        gabapentin (Neurontin) capsule 100 mg  100 mg Oral Q8HRS Linsey M Wegener, A.P.R.N.   100 mg at 05/19/25 0609    metaxalone (Skelaxin) tablet 400 mg  400 mg Oral BID Linsey M Wegener, A.P.R.N.   400 mg at 05/19/25 0609    lidocaine (Asperflex) 4 % patch 1 Patch  1 Patch Transdermal Q24HR Linsey M Wegener, A.P.R.N.   1 Patch at 05/18/25 2151    bacitracin-polymyxin b (Polysporin) 500-40122 UNIT/GM ointment   Topical TID Linsey M Wegener, A.P.R.N.   1 Each at 05/19/25 1243    insulin lispro (HumaLOG,AdmeLOG) subcutaneous injection  1-6 Units Subcutaneous Q6HRS Linsey M Wegener, A.P.R.N.   1 Units at 05/17/25 2334     And    dextrose 50 % (D50W) injection 25 g  25 g Intravenous Q15 MIN PRN Linsey M Wegener, A.P.R.N.        pantoprazole (Protonix) injection 40 mg  40 mg Intravenous BID Linsey M Wegener, A.P.R.N.   40 mg at 05/19/25 0610        ALLERGIES:  [Allergies]    [Allergies]        Allergen Reactions    Penicillins Anaphylaxis       Per chart review patient has tolerated cefdinir, ceftriaxone, and cephalexin.         SOCIAL HISTORY:  Social History               Socioeconomic History    Marital status: Unknown       Spouse name: Not on file    Number of children: Not on file    Years of education: Not on file    Highest education level: Not on file   Occupational History    Not on file   Tobacco Use    Smoking status: Unknown    Smokeless tobacco: Not on file   Substance and Sexual Activity    Alcohol use: Not on file    Drug use: Not on file    Sexual activity: Not on file   Other Topics Concern    Not on file   Social History Narrative    Not on file      Social Drivers of Health           Financial Resource Strain: Not on file   Food  "Insecurity: Not on file   Transportation Needs: Not on file   Physical Activity: Not on file   Stress: Not on file   Social Connections: Not on file   Intimate Partner Violence: Not At Risk (5/18/2025)     Humiliation, Afraid, Rape, and Kick questionnaire      Fear of Current or Ex-Partner: No      Emotionally Abused: No      Physically Abused: No      Sexually Abused: No   Housing Stability: Not on file            FAMILY HISTORY:  Family History   No family history on file.         REVIEW OF SYSTEMS:  General ROS: Negative for - chills, fever, night sweats or weight loss.  HEENT ROS: Negative  Respiratory ROS: Negative for - cough or shortness of breath.  Cardiovascular ROS:  Negative for - chest pain or palpitations.  Gastrointestinal ROS: As per the history of present illness.  Genito-Urinary ROS: Negative  Musculoskeletal ROS: Negative.  Neurological ROS: Negative  Skin ROS: negative  Hematology ROS: negative  Endocrinology ROS: Negative        PHYSICAL EXAM:  VITALS:          /79   Pulse 91   Temp 36.2 °C (97.2 °F) (Temporal)   Resp (!) 28   Ht 1.727 m (5' 8\")   Wt 92.3 kg (203 lb 7.8 oz)   SpO2 96%   BMI 30.94 kg/m²   GEN:               Evelio Pressley is a 74 y.o. male in no acute distress.  HEENT:           Mucous membranes pink and moist.  Sclera anicteric.    NECK:             Neck supple without lymphadenopathy or thyromegaly.  LUNGS:          No visible dyspnea  HEART:          Regular rate and rhythm.   ABD:               Distended, soft, no tenderness to palpation. Reducible hernia - umbilical  RECTAL:        Not done at this time.  EXT:                Without cyanosis, deformity or pitting edema.  SKIN:              Pink, warm, dry.  NEURO:          Lethargic, +asterixis     LABS:        Recent Labs     05/17/25 2022 05/18/25 0217 05/19/25  0340   WBC 14.8* 14.9* 18.7*   .7* 105.6* 100.5*            Recent Labs     05/17/25 2023 05/18/25 0217 05/19/25  0340   GLUCOSE 182* " "151* 102*   BUN 27* 29* 38*   CO2 13* 14* 16*            Lab Results   Component Value Date     INR 1.77 (H) 05/19/2025     INR 2.01 (H) 05/18/2025     INR 1.88 (H) 05/18/2025      No components found for: \"ALT\", \"AST\", \"GGT\", \"ALKPHOS\"  No results found for: \"BILINEO\"        @LASTGCAT(GJ2447)@      @[facility](OQ4660)@         IMPRESSION/PLAN:  Anemia secondary to pelvic fracture and acute blood loss hematochezia  Decompensated alcoholic cirrhosis with ascites  Leukocytosis   Thrombocytopenia  Lactic acidosis  Acute kidney injury  Elevated AST and T. 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 He did have endoscopy 2/2024 and there were no varices. His presentation at this time is LGI bleed.     Colonoscopy in am  NPO after midnight  Rectal tube  Golytely  Trend h/h     " FAMILY HISTORY:  Mother  Still living? Unknown  Family history of Parkinson's disease, Age at diagnosis: Age Unknown

## 2025-05-21 NOTE — DISCHARGE PLANNING
Care Transition Team Assessment    Patient is a 74 year old male admitted for trauma. Please see patient's H&P for prior medical history. RNCM met with patient at bedside to complete assessment. Patient lives with his daughter in a single story house with no steps to enter, Jackie Pressley ([phone]; emergency contact and NOK. No Advanced directive on file. Patient reports, prior to admission patient is independent with ADL's and IADL's. Patient has a FWW, cane, wheelchair and scooter at home. Patient has support from his daughter. Patient receives SSI and longterm monthly. Patient's PCP is Viola Yoder. Patient's preferred pharmacy is [facility] in [place]. Patient denies a history of SNF/IPR , had [facility] in the past. Patient denies any SA or MH concerns. Patient confirmed medical coverage via [employer]. Patient has means to transportation and his daughter will provide transport once medically stable for discharge.      Information Source  Orientation Level: Oriented X4  Information Given By: Patient  Who is responsible for making decisions for patient? : Patient    Readmission Evaluation  Is this a readmission?: No    Elopement Risk  Legal Hold: No  Ambulatory or Self Mobile in Wheelchair: No-Not an Elopement Risk  Elopement Risk: Not at Risk for Elopement    Interdisciplinary Discharge Planning  Lives with - Patient's Self Care Capacity: Adult Children  Housing / Facility: [address] [facility]""  Prior Services: Home-Independent    Discharge Preparedness  What is your plan after discharge?: Other (comment) (rehab)  What are your discharge supports?: Child  Prior Functional Level: Ambulatory, Independent with Activities of Daily Living, Independent with Medication Management  Difficulity with ADLs: None  Difficulity with IADLs: None    Functional Assesment  Prior Functional Level: Ambulatory, Independent with Activities of Daily Living, Independent with Medication Management    Finances  Financial Barriers  to Discharge: No  Prescription Coverage: Yes    Vision / Hearing Impairment  Vision Impairment : No  Hearing Impairment : Yes  Hearing Impairment: Both Ears, Hearing Device Not Available  Does Pt Need Special Equipment for the Hearing Impaired?: No         Advance Directive  Advance Directive?: None  Advance Directive offered?: AD Booklet refused    Domestic Abuse  Possible Abuse/Neglect Reported to:: Not Applicable    Psychological Assessment  History of Substance Abuse: None  History of Psychiatric Problems: No  Non-compliant with Treatment: No  Newly Diagnosed Illness: No    Discharge Risks or Barriers  Discharge risks or barriers?: No  Patient risk factors: Complex medical needs    Anticipated Discharge Information  Discharge Disposition: Disch to IP rehab facility or distinct part unit (62)

## 2025-05-21 NOTE — ANESTHESIA POSTPROCEDURE EVALUATION
Patient: Evelio Pressley    Procedure Summary       Date: 05/21/25 Room / Location: [facility] ROOM 26 / SURGERY SAME DAY [facility]    Anesthesia Start: 1006 Anesthesia Stop: 1048    Procedures:       COLONOSCOPY (Anus)      COLONOSCOPY, WITH POLYPECTOMY (Anus)      COLONOSCOPY, WITH BIOPSY (Anus) Diagnosis: (colon polyps, diverticulosis, colon mass)    Surgeons: Taty Rios M.D. Responsible Provider: Kike Chavez M.D.    Anesthesia Type: MAC ASA Status: 3            Final Anesthesia Type: MAC  Last vitals  BP   Blood Pressure : 118/58    Temp   36.6 °C (97.9 °F)    Pulse   86   Resp   16    SpO2   99 %      Anesthesia Post Evaluation    Patient location during evaluation: PACU  Patient participation: complete - patient participated  Level of consciousness: awake and alert    Airway patency: patent  Anesthetic complications: no  Cardiovascular status: hemodynamically stable  Respiratory status: acceptable  Hydration status: euvolemic    PONV: none          No notable events documented.     Nurse Pain Score: 0 (NPRS)

## 2025-05-21 NOTE — OR NURSING
1042 Pt arrived to PACU from OR via gurney. Report received from OR RN and anesthesiologist. Monitor applied. Pt sleeping. Respirations even and unlabored.    1048 Pt awakening, VSS    1059 More awake, denies pain and nausea    1112 Report called to Oralia RICARDO    1118 VSS, Transport requested     1128 Pt transported back to [facility] via [facility] by transport service, upper and lower dentures sent with transport

## 2025-05-21 NOTE — WOUND TEAM
"Wound team note:   Pt seen for placement of BMS. MD order verified. Pt assessed, noted to be incontinent of loose brown stool. Sacrum/buttocks pink and intact. Sphincter tone determined to be adequate. BMS placed without difficulty, 40 mL of tap water placed in retention balloon, irrigated with 60 mL warm tap water. Confirmed irrigant/stool output in tube. Nursing to irrigate q shift with 60 mL-120 mL warm tap water or until patent.     1. Nursing to turn patient to their left side and irrigate flexiseal BMS q shift with  ml OR until clear with warm tap water in the irrigation port (light blue).     2. Nursing to apply barrier paste around the BMS tubing and anal/rectal area every shift and PRN to prevent erosion to the area.     3. Nursing to check the retention balloon weekly. The balloon should have < or = 45 ml of tap water stopping when raised bubble is flush. If amount is not within the parameters, please remove excess fluid or instill the remaining amount.    4. If leaking, try gently pulling tube to seat on sphincter and make sure tubing not twisted. Can also check retention balloon volume (white port)--if <45 ml, may increase to 45 ml checking raised bubble is flush on white port if leaking.    5. If BMS falls out, please cleanse with warm water and SAVE TUBING, then leave a message for wound team at [phone] and place a wound consult for: \"re-insert BMS\".    6. If a new rectal tube BAG is needed it can be ordered in EPIC as \"Bag single use 2000mL Collect\" Type \"2000\" into orders and it will pop up.    7. To discontinue BMS system, ensure there is an order in place. Have pt lie on their left side. Deflate balloon (Balloon should not have more than 45mL of water in it). Grasp the tube as close to the anus as possible, have pt bear down and gently pull out tube.      "

## 2025-05-21 NOTE — ANESTHESIA TIME REPORT
Anesthesia Start and Stop Event Times       Date Time Event    5/21/2025 1006 Anesthesia Start     1048 Anesthesia Stop          Responsible Staff  05/21/25      Name Role Begin End    Kike Chavez M.D. Anesth 1006 1048          Overtime Reason:  no overtime (within assigned shift)    Comments:

## 2025-05-21 NOTE — ANESTHESIA PREPROCEDURE EVALUATION
Case: [id number] Date/Time: 05/21/25 0851    Procedure: COLONOSCOPY (Anus)    Anesthesia type: MAC    Pre-op diagnosis: acute blood loss hematochezia    Location: [facility] ROOM 26 / SURGERY SAME DAY [facility]    Surgeons: Taty Rios M.D.            Relevant Problems      (positive) Acute kidney injury superimposed on chronic kidney disease (HCC)   (positive) Cirrhosis of liver with ascites (HCC)       Physical Exam    Airway   Mallampati: II  TM distance: >3 FB  Neck ROM: full       Cardiovascular - normal exam  Rhythm: regular  Rate: normal    (-) murmur     Dental - normal exam           Pulmonary - normal examBreath sounds clear to auscultation     Abdominal    Neurological - normal exam                   Anesthesia Plan    ASA 3   ASA physical status 3 criteria: other (comment) and alcohol and/or substance dependence or abuse    Plan - MAC         (Cirrhosais obesityalcohol abuse)      Induction: intravenous    Postoperative Plan: Postoperative administration of opioids is intended.    Pertinent diagnostic labs and testing reviewed    Informed Consent:    Anesthetic plan and risks discussed with patient.    Use of blood products discussed with: patient whom consented to blood products.

## 2025-05-21 NOTE — PROGRESS NOTES
Received care of pt from NOC RN this am. Pt is A+O x 4, denies need for pain medication. NPO this am for colonoscopy.

## 2025-05-21 NOTE — PROGRESS NOTES
Hospital Medicine Daily Progress Note    Date of Service  5/20/2025    Chief Complaint  Evelio Pressley is a 74 y.o. male admitted 5/17/2025 with ground-level fall and right acetabular fracture and acute pelvic extravascular bleeding requiring IR embolization.    Hospital Course  74-year-old male with history of osteoarthritis, alcohol abuse and cirrhosis who presented 5/17 with a ground-level fall.  Patient was evaluated by trauma and they found right acetabular fracture and active pelvic extravascular bleeding, IR was consulted and patient underwent embolization.  Ortho also was evaluated and no intervention recommended none weight bearing with motion as tolerated on the right lower extremity.     Daughter lives with the patient, denied any significant recurrent falls, patient has been medically stable, patient is independent and no significant history of dementia.     Internal medicine was consulted on 5/19, patient was evaluated and examined at bedside, patient very lethargic, confusing, patient was dry, patient had bloody stool, hemoglobin around 7, developed leukocytosis, sepsis protocol was ordered, GI was consulted.     Interval Problem Update  5/20/2025  Patient was seen and examined on the telemetry floor.  He continues on continuous cardiac monitoring.  Ongoing blood from the rectum x 3.  Hemoglobin dropping to 8.0 from 8.5.  Discussed with gastroenterology, planning for colonoscopy tomorrow.  Patient states that he has not drinking for 3 years.  Complaining of right leg pain today.      I have discussed this patient's plan of care and discharge plan at IDT rounds today with Case Management, Nursing, Nursing leadership, and other members of the IDT team.    Consultants/Specialty  GI and trauma    Code Status  Full Code    Disposition  The patient is not medically cleared for discharge to home or a post-acute facility.  Anticipate discharge to: skilled nursing facility    I have placed the appropriate  orders for post-discharge needs.    Review of Systems  Review of Systems   Constitutional:  Negative for chills and fever.   Respiratory:  Negative for cough and shortness of breath.    Cardiovascular:  Negative for chest pain and palpitations.   Gastrointestinal:  Negative for abdominal pain, nausea and vomiting.   Musculoskeletal:  Positive for joint pain. Negative for myalgias.   Neurological:  Positive for weakness. Negative for dizziness and headaches.        Physical Exam  Temp:  [36.1 °C (97 °F)-36.5 °C (97.7 °F)] 36.5 °C (97.7 °F)  Pulse:  [81-91] 91  Resp:  [16-20] 18  BP: (123-138)/(57-68) 124/57  SpO2:  [92 %-97 %] 92 %    Physical Exam  Vitals and nursing note reviewed.   Constitutional:       General: He is not in acute distress.     Appearance: He is ill-appearing.   HENT:      Head: Normocephalic and atraumatic.      Mouth/Throat:      Mouth: Mucous membranes are moist.      Pharynx: Oropharynx is clear. No oropharyngeal exudate.   Eyes:      General: No scleral icterus.        Right eye: No discharge.         Left eye: No discharge.      Conjunctiva/sclera: Conjunctivae normal.   Cardiovascular:      Rate and Rhythm: Normal rate and regular rhythm.      Pulses: Normal pulses.      Heart sounds: Normal heart sounds. No murmur heard.  Pulmonary:      Effort: Pulmonary effort is normal. No respiratory distress.      Breath sounds: Normal breath sounds.   Abdominal:      General: Abdomen is flat. Bowel sounds are normal. There is no distension.      Palpations: Abdomen is soft.   Musculoskeletal:         General: No swelling.      Cervical back: Neck supple. No tenderness.      Right lower leg: No edema.      Left lower leg: No edema.   Skin:     General: Skin is warm and dry.      Coloration: Skin is pale.   Neurological:      Mental Status: He is alert and oriented to person, place, and time. Mental status is at baseline.      Motor: Weakness (Right lower extremity) present.   Psychiatric:          Thought Content: Thought content normal.         Judgment: Judgment normal.         Fluids    Intake/Output Summary (Last 24 hours) at 5/20/2025 1822  Last data filed at 5/20/2025 0800  Gross per 24 hour   Intake 255.91 ml   Output 350 ml   Net -94.09 ml        Laboratory  Recent Labs     05/18/25  0217 05/18/25  0802 05/19/25  0340 05/19/25  1600 05/20/25  0503   WBC 14.9*  --  18.7*  --  15.6*   RBC 2.15*  --  2.05*  --  2.30*   HEMOGLOBIN 7.6*   < > 7.0* 8.5* 8.0*   HEMATOCRIT 22.7*  --  20.6*  --  23.3*   .6*  --  100.5*  --  101.3*   MCH 35.3*  --  34.1*  --  34.8*   MCHC 33.5  --  34.0  --  34.3   RDW 68.3*  --  77.9*  --  76.3*   PLATELETCT 101*  --  102*  --  97*   MPV 9.8  --  9.9  --  10.3    < > = values in this interval not displayed.     Recent Labs     05/18/25  0217 05/19/25  0340 05/20/25  0503   SODIUM 136 136 135   POTASSIUM 5.4 4.4 4.4   CHLORIDE 108 109 110   CO2 14* 16* 17*   GLUCOSE 151* 102* 97   BUN 29* 38* 46*   CREATININE 2.20* 1.87* 1.69*   CALCIUM 8.6 8.2* 8.2*     Recent Labs     05/17/25  2030 05/18/25  0217 05/18/25  1435 05/19/25  1232 05/20/25  0503   APTT 43.0*  --   --   --   --    INR 3.14*   < > 2.01* 1.77* 1.79*    < > = values in this interval not displayed.               Imaging  US-ABDOMEN LTD (SOFT TISSUE)   Final Result      Small amount of free fluid in the abdomen, not amenable to safe percutaneous access.         DX-CHEST-PORTABLE (1 VIEW)   Final Result      1.  Slight improvement in lung volumes with bibasilar atelectasis.   2.  The remainder is stable.      DX-PELVIS-TRAUMA SERIES  3-   Final Result      Right acetabular fracture redemonstrated with grossly unchanged alignment.      DX-CHEST-PORTABLE (1 VIEW)   Final Result      1.  Subsegmental atelectasis in the left lower lobe.   2.  Atherosclerosis.   3.  The remainder of the chest radiography is within normal limits.      US-TRAUMA VEIN SCREEN LOWER BILAT EXTREMITY   Final Result      IR-EMBOLIZATION    Final Result      1.  Pelvic angiogram demonstrating no evidence of active extravasation, pseudoaneurysm or truncated vessel.   2.  Empiric Gelfoam embolization of the right internal iliac artery anterior division.   3.  Right common femoral arteriogram demonstrating appropriate access over the femoral head.   4.  Left internal iliac artery angiogram demonstrating no evidence of active extravasation, pseudoaneurysm or truncated vessel. Empiric embolization was not performed on the left due to lack of conventional angiographic findings which correlates with    recent CT findings demonstrating no evidence of injury in this region.         CT-CSPINE WITHOUT PLUS RECONS   Final Result      No acute fracture is identified.      DX-PELVIS-1 OR 2 VIEWS   Final Result      Right acetabular fracture.           Assessment/Plan  * Trauma- (present on admission)  Assessment & Plan  As per presentation following ground-level fall.  With right acetabular fracture.    Closed fracture of right acetabulum (HCC)- (present on admission)  Assessment & Plan  Ortho on board  Pain control    5/20/2025  Continue pain control with oxycodone and Skelaxin.  IV Dilaudid as needed for breakthrough pain  Continuous pulse oximetry monitoring to monitor for hypoxia and respiratory depression while receiving IV narcotic medications.    GI bleeding  Assessment & Plan  Bloody stool  Ceftriaxone and PPI twice daily  Hemoglobin every 8 hours  GI was consulted    Acute blood loss anemia (ABLA)  Assessment & Plan  Underwent embolization by IR for intrapelvic bleeding  Patient developed lower GI bleeding  Ceftriaxone  GI was consulted  Monitor hemoglobin every 8 hours and blood transfusion    Leukocytosis- (present on admission)  Assessment & Plan  Sepsis workup including blood culture pending    Lactic acid acidosis  Assessment & Plan  Improvement in lactic acidosis however still lactic is elevated  Continue IV fluid  Sepsis workup.    Fall  Assessment  & Plan  Mechanical fall  PT and OT  Pain control  Trauma board  Patient is at high risk for surgery, orthopedic did not recommend intervention    Prolonged Q-T interval on ECG- (present on admission)  Assessment & Plan  5/20/2025  Avoid QTc prolonging medications    Coagulopathy (HCC)- (present on admission)  Assessment & Plan  Due to cirrhosis with INR more than 1.8  Consider vitamin K   INR daily    5/20/2025  Continue to monitor INR    Benign prostatic hyperplasia with urinary frequency- (present on admission)  Assessment & Plan  Bladder scan and Flomax if needed    Contraindication to deep vein thrombosis (DVT) prophylaxis- (present on admission)  Assessment & Plan  5/20/2025  Due to ongoing lower GI bleed    Thrombocytopenia (HCC)- (present on admission)  Assessment & Plan  Due to cirrhosis  101    Syncopal episodes- (present on admission)  Assessment & Plan  5/20/2025  As per presentation    Hypovolemic shock (HCC)- (present on admission)  Assessment & Plan  Needed IR embolization  Blood transfusion if needed less than 7  Hemoglobin every 8 hours  Patient needs blood transfusion    5/20/2025  Continuous cardiac monitoring.  Continue to check hemoglobin every 6 hours and transfuse for hemoglobin less than 7 or hemodynamic instability.    Acute kidney injury superimposed on chronic kidney disease (HCC)- (present on admission)  Assessment & Plan  His creatinine around 1.4 and came with creatinine 2.2  Improved with IV fluid  Bladder scan to rule out retention  Avoid nephrotoxic medications  Continue IV fluid    5/20/2025  Creatinine improved to 1.69.  Continue NS at 83 mL/h.    Cirrhosis of liver with ascites (HCC)- (present on admission)  Assessment & Plan  History of cirrhosis due to alcoholism  INR 1.8 and bilirubin more than 2  MELD 24  Check ammonia and started lactulose  No volume overload, start with IV fluid  Suspicious of GI bleeding, start with ceftriaxone and PPI  GI was consulted    5/20/2025  Monitor  fluid status very closely.  Not enough ascites for paracentesis.         VTE prophylaxis:   SCDs/TEDs   pharmacologic prophylaxis contraindicated due to Anemia with lower GI bleed requiring transfusions      I have performed a physical exam and reviewed and updated ROS and Plan today (5/20/2025). In review of yesterday's note (5/19/2025), there are no changes except as documented above.      Patient is medically complex and high risk of deterioration and death.

## 2025-05-21 NOTE — CARE PLAN
The patient is Stable - Low risk of patient condition declining or worsening    Shift Goals  Clinical Goals: I/O, finish golytle  Patient Goals: rest  Family Goals: messi    Progress made toward(s) clinical / shift goals:    Problem: Knowledge Deficit - Standard  Goal: Patient and family/care givers will demonstrate understanding of plan of care, disease process/condition, diagnostic tests and medications  Description: Target End Date:  1-3 days or as soon as patient condition allowsDocument in Patient Education1.  Patient and family/caregiver oriented to unit, equipment, visitation policy and means for communicating concern2.  Complete/review Learning Assessment3.  Assess knowledge level of disease process/condition, treatment plan, diagnostic tests and medications4.  Explain disease process/condition, treatment plan, diagnostic tests and medications  Outcome: Progressing  Note: POC discussed with pt, all questions answered at this time.       Problem: Fall Risk  Goal: Patient will remain free from falls  Description: Target End Date:  Prior to discharge or change in level of careDocument interventions on the [facility] Fall Risk Assessment1.  Assess for fall risk factors2.  Implement fall precautions  Outcome: Progressing  Note: All appropriate fall precautions in place, pt uses call light for mobility assistance. Pt remains free from falls this shift         Patient is not progressing towards the following goals:

## 2025-05-21 NOTE — HOSPITAL COURSE
74-year-old male with history of osteoarthritis, alcohol abuse and cirrhosis who presented 5/17 with a ground-level fall.  Patient was evaluated by trauma and they found right acetabular fracture and active pelvic extravascular bleeding, IR was consulted and patient underwent embolization.  Ortho also was evaluated and no intervention recommended none weight bearing with motion as tolerated on the right lower extremity.     Daughter lives with the patient, denied any significant recurrent falls, patient has been medically stable, patient is independent and no significant history of dementia.     Internal medicine was consulted on 5/19, patient was evaluated and examined at bedside, patient very lethargic, confusing, patient was dry, patient had bloody stool, hemoglobin around 7, developed leukocytosis, sepsis protocol was ordered, GI was consulted.

## 2025-05-21 NOTE — PROCEDURES
OPERATIVE REPORT        PATIENT: Evelio Pressley  1950      PREOPERATIVE DIAGNOSIS/INDICATION: hematochezia    POSTOPERATIVE DIAGNOSES: necrosis, ischemia of right side of colon    PROCEDURE: COLONOSCOPY with biopsy and snare polypecomty    PHYSICIAN: Taty Rios MD    CONSENT:  OBTAINED. The risks, benefits and alternatives of the procedure were discussed in details. The risks include and are not limited to bleeding, infection, perforation, missed lesions, and sedations risks (cardiopulmonary compromise and allergic reaction to medications).    ANESTHESIA:  Per anesthesiologist.    LOCATION: [facility]    DESCRIPTION:  The patient presented to the procedure room.  After routine checkup was performed, patient was brought into endoscopy suite.  Patient was placed on his left lateral decubitus position.  Patient was sedated by anesthesia. Vital signs were monitored throughout procedure.  Oxygenation support was provided throughout procedure. Digital rectal examination was performed.  Then, a colonoscope was inserted into patient's anus, advanced to the cecum under direct visualization. Both the ileocecal valve and appendiceal orifice were seen.  Once this site was reached and examined, the colonoscope was withdrawn slowly to rectum where retroflexion was performed. The scope was then fully removed from the rectum/anal canal and the  procedure was terminated. Withdrawal time was at least 6 minutes to ensure adequate examination.     The patient tolerated the procedure well.  There were no immediate complications.    EBL: 5 cc       Digital rectal examination normal  Endoscope advanced to the cecum  Rhine prep score:   Right colon: 3; Transverse colon: 3; Left Colon: 2. BPS score: 8    FINDINGS:  Rectum: normal  There was a 5 mm polyp in sigmoid colon, removed with cold snare polypectomy and sent to pathology.  There was was a 5 mm polyp in descending colon, removed with cold snare polypectomy and sent to  pathology.  Also in the descending colon there was ulceration and erythema extending about 4 cm. Biopsied for ischemic colitis.  Transverse colon: evan;  In ascending colon there was severe ulceration and necrosis that extended 15 cm  and included cecum. It was very edematous and there was one area just distal to ileoceal valve that appeared mass like. Biopsies taken. Due to appearance of mass/versus polyp within this edema and necrosis, there were two areas that appeared like polyps. Both 1 cm in size that were removed cold snare polypectomy.   Retroflexion showed internal hemorrhoids, grade 2.       RECOMMENDATIONS:  Colon polyps  Internal hemorrhoids grade 2  Ischemia in right colon and descending colon  ? Mass in right colon versus edema    Await biopsy  CTA to assess vasculature due to severity  Anusol HC pr BID for 7 days  Discussed with Dr. Walsh    This note has been transcribed with digital voice recognition software and although it has been reviewed may contain grammatical or word errors

## 2025-05-21 NOTE — DISCHARGE PLANNING
Case Management Discharge Planning    Admission Date: 5/17/2025  GMLOS: 6.4  ALOS: 4    6-Clicks ADL Score: 15  6-Clicks Mobility Score: 12  PT and/or OT Eval ordered: Yes  Post-acute Referrals Ordered: Yes  Post-acute Choice Obtained: Yes  Has referral(s) been sent to post-acute provider:  Yes      Anticipated Discharge Dispo: Discharge Disposition: D/T to SNF with Medicare cert in anticipation of skilled care (03)    DME Needed: No    Action(s) Taken: Patient was discussed in IDT rounds, patient went for a colonoscopy today. [facility] patient.     Escalations Completed: None    Medically Clear: No    Next Steps: pending medical clearance    Barriers to Discharge: Medical clearance    Is the patient up for discharge tomorrow: No

## 2025-05-22 ENCOUNTER — APPOINTMENT (OUTPATIENT)
Dept: RADIOLOGY | Facility: MEDICAL CENTER | Age: 75
DRG: 270 | End: 2025-05-22
Attending: PHYSICIAN ASSISTANT
Payer: MEDICARE

## 2025-05-22 LAB
ALBUMIN SERPL BCP-MCNC: 2.6 G/DL (ref 3.2–4.9)
ALBUMIN SERPL BCP-MCNC: 2.6 G/DL (ref 3.2–4.9)
ALBUMIN/GLOB SERPL: 1.1 G/DL
ALBUMIN/GLOB SERPL: 1.1 G/DL
ALP SERPL-CCNC: 64 U/L (ref 30–99)
ALP SERPL-CCNC: 70 U/L (ref 30–99)
ALT SERPL-CCNC: 18 U/L (ref 2–50)
ALT SERPL-CCNC: 19 U/L (ref 2–50)
ANION GAP SERPL CALC-SCNC: 10 MMOL/L (ref 7–16)
ANION GAP SERPL CALC-SCNC: 9 MMOL/L (ref 7–16)
ANISOCYTOSIS BLD QL SMEAR: ABNORMAL
AST SERPL-CCNC: 34 U/L (ref 12–45)
AST SERPL-CCNC: 40 U/L (ref 12–45)
BASOPHILS # BLD AUTO: 0.9 % (ref 0–1.8)
BASOPHILS # BLD: 0.14 K/UL (ref 0–0.12)
BILIRUB SERPL-MCNC: 2.2 MG/DL (ref 0.1–1.5)
BILIRUB SERPL-MCNC: 2.6 MG/DL (ref 0.1–1.5)
BUN SERPL-MCNC: 27 MG/DL (ref 8–22)
BUN SERPL-MCNC: 30 MG/DL (ref 8–22)
BURR CELLS BLD QL SMEAR: NORMAL
CALCIUM ALBUM COR SERPL-MCNC: 9.3 MG/DL (ref 8.5–10.5)
CALCIUM ALBUM COR SERPL-MCNC: 9.4 MG/DL (ref 8.5–10.5)
CALCIUM SERPL-MCNC: 8.2 MG/DL (ref 8.5–10.5)
CALCIUM SERPL-MCNC: 8.3 MG/DL (ref 8.5–10.5)
CHLORIDE SERPL-SCNC: 111 MMOL/L (ref 96–112)
CHLORIDE SERPL-SCNC: 111 MMOL/L (ref 96–112)
CO2 SERPL-SCNC: 17 MMOL/L (ref 20–33)
CO2 SERPL-SCNC: 17 MMOL/L (ref 20–33)
CREAT SERPL-MCNC: 1.17 MG/DL (ref 0.5–1.4)
CREAT SERPL-MCNC: 1.25 MG/DL (ref 0.5–1.4)
EOSINOPHIL # BLD AUTO: 0.27 K/UL (ref 0–0.51)
EOSINOPHIL NFR BLD: 1.7 % (ref 0–6.9)
ERYTHROCYTE [DISTWIDTH] IN BLOOD BY AUTOMATED COUNT: 73.6 FL (ref 35.9–50)
ERYTHROCYTE [DISTWIDTH] IN BLOOD BY AUTOMATED COUNT: 74.5 FL (ref 35.9–50)
GFR SERPLBLD CREATININE-BSD FMLA CKD-EPI: 60 ML/MIN/1.73 M 2
GFR SERPLBLD CREATININE-BSD FMLA CKD-EPI: 65 ML/MIN/1.73 M 2
GLOBULIN SER CALC-MCNC: 2.3 G/DL (ref 1.9–3.5)
GLOBULIN SER CALC-MCNC: 2.3 G/DL (ref 1.9–3.5)
GLUCOSE BLD STRIP.AUTO-MCNC: 101 MG/DL (ref 65–99)
GLUCOSE BLD STRIP.AUTO-MCNC: 116 MG/DL (ref 65–99)
GLUCOSE BLD STRIP.AUTO-MCNC: 123 MG/DL (ref 65–99)
GLUCOSE BLD STRIP.AUTO-MCNC: 86 MG/DL (ref 65–99)
GLUCOSE BLD STRIP.AUTO-MCNC: 93 MG/DL (ref 65–99)
GLUCOSE SERPL-MCNC: 127 MG/DL (ref 65–99)
GLUCOSE SERPL-MCNC: 98 MG/DL (ref 65–99)
HCT VFR BLD AUTO: 24.5 % (ref 42–52)
HCT VFR BLD AUTO: 24.9 % (ref 42–52)
HCT VFR BLD AUTO: 26.2 % (ref 42–52)
HCT VFR BLD AUTO: 27.4 % (ref 42–52)
HGB BLD-MCNC: 8.2 G/DL (ref 14–18)
HGB BLD-MCNC: 8.3 G/DL (ref 14–18)
HGB BLD-MCNC: 8.9 G/DL (ref 14–18)
HGB BLD-MCNC: 9.2 G/DL (ref 14–18)
INR PPP: 1.54 (ref 0.87–1.13)
LYMPHOCYTES # BLD AUTO: 0.96 K/UL (ref 1–4.8)
LYMPHOCYTES NFR BLD: 6.1 % (ref 22–41)
MACROCYTES BLD QL SMEAR: ABNORMAL
MAGNESIUM SERPL-MCNC: 2.3 MG/DL (ref 1.5–2.5)
MANUAL DIFF BLD: NORMAL
MCH RBC QN AUTO: 35.3 PG (ref 27–33)
MCH RBC QN AUTO: 35.5 PG (ref 27–33)
MCHC RBC AUTO-ENTMCNC: 33.3 G/DL (ref 32.3–36.5)
MCHC RBC AUTO-ENTMCNC: 33.5 G/DL (ref 32.3–36.5)
MCV RBC AUTO: 105.6 FL (ref 81.4–97.8)
MCV RBC AUTO: 106.4 FL (ref 81.4–97.8)
MONOCYTES # BLD AUTO: 0.4 K/UL (ref 0–0.85)
MONOCYTES NFR BLD AUTO: 2.6 % (ref 0–13.4)
MORPHOLOGY BLD-IMP: NORMAL
NEUTROPHILS # BLD AUTO: 14.01 K/UL (ref 1.82–7.42)
NEUTROPHILS NFR BLD: 88.7 % (ref 44–72)
NRBC # BLD AUTO: 0 K/UL
NRBC BLD-RTO: 0 /100 WBC (ref 0–0.2)
NT-PROBNP SERPL IA-MCNC: 1658 PG/ML (ref 0–125)
OVALOCYTES BLD QL SMEAR: NORMAL
PHOSPHATE SERPL-MCNC: 2.1 MG/DL (ref 2.5–4.5)
PLATELET # BLD AUTO: 88 K/UL (ref 164–446)
PLATELET # BLD AUTO: 96 K/UL (ref 164–446)
PLATELET BLD QL SMEAR: NORMAL
PLATELETS.RETICULATED NFR BLD AUTO: 1.6 % (ref 0.6–13.1)
PLATELETS.RETICULATED NFR BLD AUTO: 1.9 % (ref 0.6–13.1)
PMV BLD AUTO: 10.3 FL (ref 9–12.9)
PMV BLD AUTO: 9.9 FL (ref 9–12.9)
POIKILOCYTOSIS BLD QL SMEAR: NORMAL
POLYCHROMASIA BLD QL SMEAR: NORMAL
POTASSIUM SERPL-SCNC: 4.3 MMOL/L (ref 3.6–5.5)
POTASSIUM SERPL-SCNC: 4.3 MMOL/L (ref 3.6–5.5)
PROT SERPL-MCNC: 4.9 G/DL (ref 6–8.2)
PROT SERPL-MCNC: 4.9 G/DL (ref 6–8.2)
PROTHROMBIN TIME: 18.6 SEC (ref 12–14.6)
RBC # BLD AUTO: 2.32 M/UL (ref 4.7–6.1)
RBC # BLD AUTO: 2.34 M/UL (ref 4.7–6.1)
RBC BLD AUTO: PRESENT
SODIUM SERPL-SCNC: 137 MMOL/L (ref 135–145)
SODIUM SERPL-SCNC: 138 MMOL/L (ref 135–145)
WBC # BLD AUTO: 13.3 K/UL (ref 4.8–10.8)
WBC # BLD AUTO: 15.8 K/UL (ref 4.8–10.8)

## 2025-05-22 PROCEDURE — 85007 BL SMEAR W/DIFF WBC COUNT: CPT

## 2025-05-22 PROCEDURE — 85610 PROTHROMBIN TIME: CPT

## 2025-05-22 PROCEDURE — A9270 NON-COVERED ITEM OR SERVICE: HCPCS

## 2025-05-22 PROCEDURE — 85055 RETICULATED PLATELET ASSAY: CPT

## 2025-05-22 PROCEDURE — 85018 HEMOGLOBIN: CPT | Mod: 91

## 2025-05-22 PROCEDURE — 80053 COMPREHEN METABOLIC PANEL: CPT

## 2025-05-22 PROCEDURE — 71045 X-RAY EXAM CHEST 1 VIEW: CPT

## 2025-05-22 PROCEDURE — 87040 BLOOD CULTURE FOR BACTERIA: CPT | Mod: 91

## 2025-05-22 PROCEDURE — 82962 GLUCOSE BLOOD TEST: CPT | Mod: 91

## 2025-05-22 PROCEDURE — 99233 SBSQ HOSP IP/OBS HIGH 50: CPT | Performed by: STUDENT IN AN ORGANIZED HEALTH CARE EDUCATION/TRAINING PROGRAM

## 2025-05-22 PROCEDURE — 83880 ASSAY OF NATRIURETIC PEPTIDE: CPT

## 2025-05-22 PROCEDURE — 700111 HCHG RX REV CODE 636 W/ 250 OVERRIDE (IP)

## 2025-05-22 PROCEDURE — 770001 HCHG ROOM/CARE - MED/SURG/GYN PRIV*

## 2025-05-22 PROCEDURE — 700102 HCHG RX REV CODE 250 W/ 637 OVERRIDE(OP)

## 2025-05-22 PROCEDURE — A9270 NON-COVERED ITEM OR SERVICE: HCPCS | Performed by: STUDENT IN AN ORGANIZED HEALTH CARE EDUCATION/TRAINING PROGRAM

## 2025-05-22 PROCEDURE — 700102 HCHG RX REV CODE 250 W/ 637 OVERRIDE(OP): Performed by: STUDENT IN AN ORGANIZED HEALTH CARE EDUCATION/TRAINING PROGRAM

## 2025-05-22 PROCEDURE — 85027 COMPLETE CBC AUTOMATED: CPT

## 2025-05-22 PROCEDURE — 85014 HEMATOCRIT: CPT

## 2025-05-22 PROCEDURE — 700101 HCHG RX REV CODE 250

## 2025-05-22 PROCEDURE — 700105 HCHG RX REV CODE 258: Performed by: INTERNAL MEDICINE

## 2025-05-22 PROCEDURE — 97530 THERAPEUTIC ACTIVITIES: CPT

## 2025-05-22 PROCEDURE — 99232 SBSQ HOSP IP/OBS MODERATE 35: CPT | Performed by: NURSE PRACTITIONER

## 2025-05-22 PROCEDURE — 84100 ASSAY OF PHOSPHORUS: CPT

## 2025-05-22 PROCEDURE — 36415 COLL VENOUS BLD VENIPUNCTURE: CPT

## 2025-05-22 RX ADMIN — PANTOPRAZOLE SODIUM 40 MG: 40 INJECTION, POWDER, FOR SOLUTION INTRAVENOUS at 17:14

## 2025-05-22 RX ADMIN — METAXALONE 400 MG: 800 TABLET ORAL at 04:55

## 2025-05-22 RX ADMIN — DIBASIC SODIUM PHOSPHATE, MONOBASIC POTASSIUM PHOSPHATE AND MONOBASIC SODIUM PHOSPHATE 500 MG: 852; 155; 130 TABLET ORAL at 17:14

## 2025-05-22 RX ADMIN — GABAPENTIN 100 MG: 100 CAPSULE ORAL at 21:18

## 2025-05-22 RX ADMIN — PANTOPRAZOLE SODIUM 40 MG: 40 INJECTION, POWDER, FOR SOLUTION INTRAVENOUS at 04:55

## 2025-05-22 RX ADMIN — SODIUM CHLORIDE: 9 INJECTION, SOLUTION INTRAVENOUS at 23:26

## 2025-05-22 RX ADMIN — SODIUM CHLORIDE: 9 INJECTION, SOLUTION INTRAVENOUS at 00:05

## 2025-05-22 RX ADMIN — HYDROCORTISONE ACETATE 25 MG: 25 SUPPOSITORY RECTAL at 04:55

## 2025-05-22 RX ADMIN — SODIUM CHLORIDE: 9 INJECTION, SOLUTION INTRAVENOUS at 11:24

## 2025-05-22 RX ADMIN — DIBASIC SODIUM PHOSPHATE, MONOBASIC POTASSIUM PHOSPHATE AND MONOBASIC SODIUM PHOSPHATE 500 MG: 852; 155; 130 TABLET ORAL at 12:24

## 2025-05-22 RX ADMIN — METAXALONE 400 MG: 800 TABLET ORAL at 17:14

## 2025-05-22 RX ADMIN — GABAPENTIN 100 MG: 100 CAPSULE ORAL at 04:55

## 2025-05-22 RX ADMIN — GABAPENTIN 100 MG: 100 CAPSULE ORAL at 12:24

## 2025-05-22 RX ADMIN — LIDOCAINE 1 PATCH: 4 PATCH TOPICAL at 21:18

## 2025-05-22 ASSESSMENT — COGNITIVE AND FUNCTIONAL STATUS - GENERAL
DRESSING REGULAR LOWER BODY CLOTHING: A LOT
TURNING FROM BACK TO SIDE WHILE IN FLAT BAD: A LOT
MOVING FROM LYING ON BACK TO SITTING ON SIDE OF FLAT BED: A LOT
MOBILITY SCORE: 11
EATING MEALS: A LITTLE
PERSONAL GROOMING: A LITTLE
DRESSING REGULAR UPPER BODY CLOTHING: A LITTLE
MOBILITY SCORE: 11
HELP NEEDED FOR BATHING: A LOT
DAILY ACTIVITIY SCORE: 15
SUGGESTED CMS G CODE MODIFIER MOBILITY: CL
SUGGESTED CMS G CODE MODIFIER DAILY ACTIVITY: CK
STANDING UP FROM CHAIR USING ARMS: A LITTLE
MOVING FROM LYING ON BACK TO SITTING ON SIDE OF FLAT BED: A LOT
MOVING TO AND FROM BED TO CHAIR: A LOT
MOVING TO AND FROM BED TO CHAIR: A LOT
STANDING UP FROM CHAIR USING ARMS: A LITTLE
TURNING FROM BACK TO SIDE WHILE IN FLAT BAD: A LOT
CLIMB 3 TO 5 STEPS WITH RAILING: TOTAL
WALKING IN HOSPITAL ROOM: TOTAL
TOILETING: A LOT
SUGGESTED CMS G CODE MODIFIER MOBILITY: CL
WALKING IN HOSPITAL ROOM: TOTAL
CLIMB 3 TO 5 STEPS WITH RAILING: TOTAL

## 2025-05-22 ASSESSMENT — ENCOUNTER SYMPTOMS
VOMITING: 0
DEPRESSION: 0
ABDOMINAL PAIN: 0
WEAKNESS: 1
DIARRHEA: 0
DIZZINESS: 0
BLOOD IN STOOL: 0
HEADACHES: 0
SHORTNESS OF BREATH: 0
CHILLS: 0
BACK PAIN: 0
NAUSEA: 0
BLURRED VISION: 0
COUGH: 0
PALPITATIONS: 0
FEVER: 0
CONSTIPATION: 0
HEARTBURN: 0
MYALGIAS: 0

## 2025-05-22 ASSESSMENT — PAIN DESCRIPTION - PAIN TYPE: TYPE: ACUTE PAIN

## 2025-05-22 ASSESSMENT — PATIENT HEALTH QUESTIONNAIRE - PHQ9
2. FEELING DOWN, DEPRESSED, IRRITABLE, OR HOPELESS: NOT AT ALL
1. LITTLE INTEREST OR PLEASURE IN DOING THINGS: NOT AT ALL
SUM OF ALL RESPONSES TO PHQ9 QUESTIONS 1 AND 2: 0

## 2025-05-22 ASSESSMENT — GAIT ASSESSMENTS: GAIT LEVEL OF ASSIST: UNABLE TO PARTICIPATE

## 2025-05-22 NOTE — CARE PLAN
The patient is Stable - Low risk of patient condition declining or worsening    Shift Goals  Clinical Goals: Increase mobility, manage pain. comfort, Q2H turns  Patient Goals: Comfort, no pain  Family Goals: No family present      Problem: Knowledge Deficit - Standard  Goal: Patient and family/care givers will demonstrate understanding of plan of care, disease process/condition, diagnostic tests and medications  Description: Target End Date:  1-3 days or as soon as patient condition allowsDocument in Patient Education1.  Patient and family/caregiver oriented to unit, equipment, visitation policy and means for communicating concern2.  Complete/review Learning Assessment3.  Assess knowledge level of disease process/condition, treatment plan, diagnostic tests and medications4.  Explain disease process/condition, treatment plan, diagnostic tests and medications  Outcome: Progressing  Note: Patient and family at bedside updated in regard to plan of care, all questions answered accordingly. Pt compliant with interventions, communicates needs effectively with use off call light.      Problem: Urinary - Renal Perfusion  Goal: Ability to achieve and maintain adequate renal perfusion and functioning will improve  Description: Target End Date:  Prior to discharge or change in level of careDocument on I/O and Assessment flowsheet1.  Urine output will remain greater than 0.5ml/Kg/HR2.  Monitor amount and/or characteristics of urine per order/policy. Specific gravity per order/policy3.  Assess signs and symptoms of renal dysfunction  Outcome: Progressing  Note: Patient with good urine output throughout shift. He is intermittently incontinent, has a condom cath in place that has required few replacements throughout shift as he has significant amount of penile and scrotal edema not allowing for good adhesion of condom cath, calls staff as needed for assistance.        Progress made toward(s) clinical / shift goals:      Patient is  not progressing towards the following goals:

## 2025-05-22 NOTE — CARE PLAN
The patient is Stable - Low risk of patient condition declining or worsening    Shift Goals  Clinical Goals: monitor labs, skin integrity  Patient Goals: rest  Family Goals: ANGIE    Progress made toward(s) clinical / shift goals:        Problem: Urinary - Renal Perfusion  Goal: Ability to achieve and maintain adequate renal perfusion and functioning will improve  Description: Target End Date:  Prior to discharge or change in level of careDocument on I/O and Assessment flowsheet1.  Urine output will remain greater than 0.5ml/Kg/HR2.  Monitor amount and/or characteristics of urine per order/policy. Specific gravity per order/policy3.  Assess signs and symptoms of renal dysfunction  Outcome: Progressing  Note: Pt has condom catheter on to monitor urine output. Intake also being monitored for this pt.        Progress made toward(s) clinical / shift goals:        Problem: Bowel Elimination  Goal: Establish and maintain regular bowel function  Description: Target End Date:  Prior to discharge or change in level of care1.   Note date of last BM2.   Educate about diet, fluid intake, medication and activity to promote bowel function3.   Educate signs and symptoms of constipation and interventions to implement4.   Pharmacologic bowel management per provider order5.   Regular toileting schedule6.   Upright position for toileting7.   High fiber diet8.   Encourage hydration9.   Collaborate with Clinical [id number]. Care and maintenance of ostomy if applicable  Outcome: Progressing  Note: Pt had BMS removed previous shift. Currently, having liquid bowel movements.       Patient is not progressing towards the following goals: N/A

## 2025-05-22 NOTE — CARE PLAN
The patient is Watcher - Medium risk of patient condition declining or worsening    Shift Goals  Clinical Goals: colonoscopy today  Patient Goals: colonoscopy today  Family Goals: messi    Progress made toward(s) clinical / shift goals:  colonoscopy done today. Hospitalist discussed results with pt and family.       Problem: Knowledge Deficit - Standard  Goal: Patient and family/care givers will demonstrate understanding of plan of care, disease process/condition, diagnostic tests and medications  Outcome: Progressing  Note: Discussed plan of care for today w/ pt this am, he is A+Ox4, he verbalizes understanding of his plan of care, no questions. Emotional support given. Reinforcing education as necessary.    Updated family concerning POC, questions answered.        Problem: Skin Integrity  Goal: Skin integrity is maintained or improved  Outcome: Progressing  Note: Pt is on a Q 2 hr turn and reposition schedule while in bed today.     See ADL flowsheets - pt up to chair today.

## 2025-05-22 NOTE — PROGRESS NOTES
Lab called with critical result of POSTIVE BLOOD CULTURE 1 SET GRAM STAIN POSITIVE COCCI IN CLUSTERS, PCR - FOR STAPH AUREUS AND MRSA at 1253 Critical lab result read back to lab.  Dr. Walsh notified of critical lab result at 1300.

## 2025-05-22 NOTE — DISCHARGE PLANNING
Case Management Discharge Planning    Admission Date: 5/17/2025  GMLOS: 6.4  ALOS: 5    6-Clicks ADL Score: 15  6-Clicks Mobility Score: 11  PT and/or OT Eval ordered: Yes  Post-acute Referrals Ordered: Yes  Post-acute Choice Obtained: Yes  Has referral(s) been sent to post-acute provider:  Yes      Anticipated Discharge Dispo: Discharge Disposition: Disch to  rehab facility or distinct part unit ()    DME Needed: No    Action(s) Taken: Updated Provider/Nurse on Discharge Plan, Choice obtained, and OTHER    Obt[facility], per [facility] insurance authorization remains pending.  IMM delivered, faxed to [facility]. Pt's daughter was present. All questions were answered.    1450- Pt discussed in PM rounds, per MD pt is medically cleared for transfer to [facility].      1540 - TC placed to [facility], spoke with Jessica to communicate that pt is medically cleared, insurance authorization remains pending.    Escalations Completed: None    Medically Clear: No    Next Steps: Confirm bed availability and arrange for transportation once pt is medically cleared.    Barriers to Discharge: Medical clearance, Pending Insurance Authorization, and Transportation    Is the patient up for discharge tomorrow: No

## 2025-05-22 NOTE — THERAPY
Physical Therapy   Daily Treatment     Patient Name: Evelio Pressley  Age:  74 y.o., Sex:  male  Medical Record #: [id number]  Today's Date: 5/22/2025     Precautions  Precautions: Fall Risk;Non Weight Bearing Right Lower Extremity    Assessment    Patient seen for PT treatment session. Patient in bed, agreeable for the session. Able to participate with bed mobility, EOB sitting, sit-stand with FWW, BSC transfer, chair transfer as detailed below. Will continue to benefit from PT services and recommend post-acute placement at this time.      Plan    Treatment Plan Status: Continue Current Treatment Plan  Type of Treatment: Bed Mobility, Equipment, Neuro Re-Education / Balance, Therapeutic Exercise, Therapeutic Activities, Family / Caregiver Training  Treatment Frequency: 4 Times per Week  Treatment Duration: Until Therapy Goals Met    DC Equipment Recommendations: None (Owns FWW, WC, scooter)  Discharge Recommendations: Recommend post-acute placement for additional physical therapy services prior to discharge home    Objective     05/22/25 0944   Time In/Time Out   Therapy Start Time 0915   Therapy End Time 0944   Total Therapy Time 29   Precautions   Precautions Fall Risk;Non Weight Bearing Right Lower Extremity   Vitals   Pulse 80   Patient BP Position Rosales's Position   Blood Pressure  129/61   Pulse Oximetry 94 %   O2 Delivery Device None - Room Air   Vitals Comments Post activity, seated in the chair: /59, HR 66, SpO2 95   Pain   Pain Scales 0 to 10 Scale    Intervention Distraction;Rest;Repositioned   Pain 0 - 10 Group   Location Hip   Location Orientation Right   Therapist Pain Assessment Prior to Activity;During Activity;Post Activity;Post Activity Pain Same as Prior to Activity   Cognition    Cognition / Consciousness X   Speech/ Communication Hard of Hearing   Level of Consciousness Alert   Other Treatments   Other Treatments Provided Assisted to the BSC for bowel movement, however patient did not  have any. Provided hand sanitizing wipes for hygiene.   Balance   Sitting Balance (Static) Fair -   Sitting Balance (Dynamic) Fair -   Standing Balance (Static) Poor   Standing Balance (Dynamic) Trace +   Weight Shift Sitting Fair   Weight Shift Standing Absent   Skilled Intervention Verbal Cuing;Sequencing;Facilitation;Compensatory Strategies;Postural Facilitation   Comments Seated EOB; Standing W FWW   Bed Mobility    Supine to Sit Moderate Assist   Scooting Minimal Assist   Rolling Moderate Assist to Lt.   Skilled Intervention Verbal Cuing;Sequencing;Facilitation;Compensatory Strategies   Comments HOB raised, use of bed rail, towards L side; assistance to bring RLE OOB, minimum assistance to bring trunk upright; increased time & effort to perform the task   Gait Analysis   Gait Level Of Assist Unable to Participate   Comments Continues to have difficulty to maintain NWB on RLE, tends to keep his R foot on the ground and reports minimal weight through RLE   Functional Mobility   Sit to Stand Minimal Assist   Bed, Chair, Wheelchair Transfer Moderate Assist   Toilet Transfers Moderate Assist   Transfer Method Stand Pivot   Mobility Bed-EOB sitting-sit-stand with LSM-WEA-xdosc pivot towards L-BSC-sit-stand with FWW-switched BSC with chair   Skilled Intervention Verbal Cuing;Sequencing;Facilitation;Compensatory Strategies   Comments W FWW; cues to maintain NWB on RLE, hand placement, LE placement, weight shift, posture   6 Clicks Assessment - How much HELP from from another person do you currently need... (If the patient hasn't done an activity recently, how much help from another person do you think he/she would need if he/she tried?)   Turning from your back to your side while in a flat bed without using bedrails? 2   Moving from lying on your back to sitting on the side of a flat bed without using bedrails? 2   Moving to and from a bed to a chair (including a wheelchair)? 2   Standing up from a chair using your  arms (e.g., wheelchair, or bedside chair)? 3   Walking in hospital room? 1   Climbing 3-5 steps with a railing? 1   6 clicks Mobility Score 11   Activity Tolerance   Sitting in Chair Post session   Patient / Family Goals    Patient / Family Goal #1 Return home   Goal #1 Outcome Goal not met   Short Term Goals    Short Term Goal # 1 Pt will be able to demonstrate WC mobility and management with supervision to return home following NWB precautions for RLE within 6 visits.   Goal Outcome # 1 goal not met   Short Term Goal # 2 Pt will be able to perform supine to sit, sit to supine with supervision to safely get in & out of bed within 6 visits.   Goal Outcome # 2 Progressing slower than expected   Short Term Goal # 3 Pt will be able to complete sit to stand with FWW with supervision while maintaining NWB on RLE to return home within 6 visits.   Goal Outcome # 3 Progressing slower than expected   Short Term Goal # 4 Patient will perform wheel chair transfers with LRAD while maintaining NWB on RLE with supervision in 6 visits to safely get OOB to chair   Goal Outcome # 4 Goal not met   Physical Therapy Treatment Plan   Physical Therapy Treatment Plan Continue Current Treatment Plan   Anticipated Discharge Equipment and Recommendations   DC Equipment Recommendations None  (Owns FWW, WC, scooter)   Discharge Recommendations Recommend post-acute placement for additional physical therapy services prior to discharge home   Interdisciplinary Plan of Care Collaboration   IDT Collaboration with  Nursing;Certified Nursing Assistant   Patient Position at End of Therapy Seated;Chair Alarm On;Call Light within Reach;Tray Table within Reach;Family / Friend in Room  (Daughter at bedside; Waffle cushion underneath)   Session Information   Date / Session Number  5/22-2(2/4, 5/26)

## 2025-05-22 NOTE — PROGRESS NOTES
Hospital Medicine Daily Progress Note    Date of Service  5/21/2025    Chief Complaint  Evelio Pressley is a 74 y.o. male admitted 5/17/2025 with ground-level fall and right acetabular fracture and acute pelvic extravascular bleeding requiring IR embolization.    Hospital Course  74-year-old male with history of osteoarthritis, alcohol abuse and cirrhosis who presented 5/17 with a ground-level fall.  Patient was evaluated by trauma and they found right acetabular fracture and active pelvic extravascular bleeding, IR was consulted and patient underwent embolization.  Ortho also was evaluated and no intervention recommended none weight bearing with motion as tolerated on the right lower extremity.     Daughter lives with the patient, denied any significant recurrent falls, patient has been medically stable, patient is independent and no significant history of dementia.     Internal medicine was consulted on 5/19, patient was evaluated and examined at bedside, patient very lethargic, confusing, patient was dry, patient had bloody stool, hemoglobin around 7, developed leukocytosis, sepsis protocol was ordered, GI was consulted.     Interval Problem Update  5/20/2025  Patient was seen and examined on the telemetry floor.  He continues on continuous cardiac monitoring.  Ongoing blood from the rectum x 3.  Hemoglobin dropping to 8.0 from 8.5.  Discussed with gastroenterology, planning for colonoscopy tomorrow.  Patient states that he has not drinking for 3 years.  Complaining of right leg pain today.    5/21/2025  Patient was seen and examined on the telemetry floor.  He continues on continuous cardiac monitoring.  Discussed with Dr. Rios of GI.  Large necrotic mass seen in the colon.  Biopsies obtained.  CT angiogram of the abdamen and pelvis ordered for assessment of vasculature.  Hemoglobin remaining stable.  Patient is alert and oriented x 3.  Discussed plan of care with the daughter at bedside.  On 1 LPM oxygen  by nasal cannula.   Continuing on normal saline 83 m/h.  Creatinine improving.  Holding home spironolactone and furosemide      I have discussed this patient's plan of care and discharge plan at IDT rounds today with Case Management, Nursing, Nursing leadership, and other members of the IDT team.    Consultants/Specialty  GI and trauma    Code Status  Full Code    Disposition  The patient is not medically cleared for discharge to home or a post-acute facility.  Anticipate discharge to: skilled nursing facility    I have placed the appropriate orders for post-discharge needs.    Review of Systems  Review of Systems   Constitutional:  Negative for chills and fever.   Respiratory:  Negative for cough and shortness of breath.    Cardiovascular:  Negative for chest pain and palpitations.   Gastrointestinal:  Positive for blood in stool. Negative for abdominal pain, nausea and vomiting.   Musculoskeletal:  Positive for joint pain. Negative for myalgias.   Neurological:  Positive for weakness. Negative for dizziness and headaches.        Physical Exam  Temp:  [36.2 °C (97.2 °F)-37.2 °C (99 °F)] 36.4 °C (97.5 °F)  Pulse:  [65-96] 74  Resp:  [15-20] 18  BP: (117-144)/(58-72) 144/64  SpO2:  [94 %-100 %] 98 %    Physical Exam  Vitals and nursing note reviewed. Exam conducted with a chaperone present.   Constitutional:       General: He is not in acute distress.     Appearance: He is ill-appearing.   HENT:      Head: Normocephalic and atraumatic.      Mouth/Throat:      Mouth: Mucous membranes are moist.      Pharynx: Oropharynx is clear. No oropharyngeal exudate.   Eyes:      General: No scleral icterus.        Right eye: No discharge.         Left eye: No discharge.      Conjunctiva/sclera: Conjunctivae normal.   Cardiovascular:      Rate and Rhythm: Normal rate and regular rhythm.      Pulses: Normal pulses.      Heart sounds: Normal heart sounds. No murmur heard.  Pulmonary:      Effort: Pulmonary effort is normal. No  respiratory distress.      Breath sounds: Normal breath sounds.   Abdominal:      General: Abdomen is flat. Bowel sounds are normal. There is no distension.      Palpations: Abdomen is soft.   Musculoskeletal:         General: No swelling.      Cervical back: Neck supple. No tenderness.      Right lower leg: No edema.      Left lower leg: No edema.   Skin:     General: Skin is warm and dry.      Coloration: Skin is pale.   Neurological:      Mental Status: He is alert and oriented to person, place, and time. Mental status is at baseline.      Motor: Weakness (Right lower extremity) present.   Psychiatric:         Thought Content: Thought content normal.         Judgment: Judgment normal.         Fluids    Intake/Output Summary (Last 24 hours) at 5/21/2025 1757  Last data filed at 5/21/2025 1600  Gross per 24 hour   Intake 600 ml   Output 4000 ml   Net -3400 ml        Laboratory  Recent Labs     05/20/25  0503 05/21/25  0002 05/21/25  0517 05/21/25  1214   WBC 15.6* 13.8* 13.2*  --    RBC 2.30* 2.23* 2.24*  --    HEMOGLOBIN 8.0* 7.8* 7.8* 8.3*   HEMATOCRIT 23.3* 23.1* 22.7* 25.4*   .3* 103.6* 101.3*  --    MCH 34.8* 35.0* 34.8*  --    MCHC 34.3 33.8 34.4  --    RDW 76.3* 74.5* 72.2*  --    PLATELETCT 97* 86* 90*  --    MPV 10.3 10.4 9.9  --      Recent Labs     05/19/25  0340 05/20/25  0503 05/21/25  0002   SODIUM 136 135 137   POTASSIUM 4.4 4.4 4.3   CHLORIDE 109 110 110   CO2 16* 17* 18*   GLUCOSE 102* 97 93   BUN 38* 46* 37*   CREATININE 1.87* 1.69* 1.40   CALCIUM 8.2* 8.2* 8.2*     Recent Labs     05/19/25  1232 05/20/25  0503 05/21/25  0517   INR 1.77* 1.79* 1.66*               Imaging  DX-CHEST-PORTABLE (1 VIEW)   Final Result      No acute cardiopulmonary abnormality.      US-ABDOMEN LTD (SOFT TISSUE)   Final Result      Small amount of free fluid in the abdomen, not amenable to safe percutaneous access.         DX-CHEST-PORTABLE (1 VIEW)   Final Result      1.  Slight improvement in lung volumes with  bibasilar atelectasis.   2.  The remainder is stable.      DX-PELVIS-TRAUMA SERIES  3-   Final Result      Right acetabular fracture redemonstrated with grossly unchanged alignment.      DX-CHEST-PORTABLE (1 VIEW)   Final Result      1.  Subsegmental atelectasis in the left lower lobe.   2.  Atherosclerosis.   3.  The remainder of the chest radiography is within normal limits.      US-TRAUMA VEIN SCREEN LOWER BILAT EXTREMITY   Final Result      IR-EMBOLIZATION   Final Result      1.  Pelvic angiogram demonstrating no evidence of active extravasation, pseudoaneurysm or truncated vessel.   2.  Empiric Gelfoam embolization of the right internal iliac artery anterior division.   3.  Right common femoral arteriogram demonstrating appropriate access over the femoral head.   4.  Left internal iliac artery angiogram demonstrating no evidence of active extravasation, pseudoaneurysm or truncated vessel. Empiric embolization was not performed on the left due to lack of conventional angiographic findings which correlates with    recent CT findings demonstrating no evidence of injury in this region.         CT-CSPINE WITHOUT PLUS RECONS   Final Result      No acute fracture is identified.      DX-PELVIS-1 OR 2 VIEWS   Final Result      Right acetabular fracture.      CTA ABDOMEN PELVIS W & W/O POST PROCESS    (Results Pending)        Assessment/Plan  * Hypovolemic shock (HCC)- (present on admission)  Assessment & Plan  Needed IR embolization  Blood transfusion if needed less than 7  Hemoglobin every 8 hours  Patient needs blood transfusion    5/20/2025  Continuous cardiac monitoring.  Continue to check hemoglobin every 6 hours and transfuse for hemoglobin less than 7 or hemodynamic instability.    5/21/2025  Likely bleeding from colon mass noted on colonoscopy.  Discussed with Dr. Rios of gastroenterology.  CT angiogram ordered for evaluation of vasculature.    Closed fracture of right acetabulum (HCC)- (present on  admission)  Assessment & Plan  Ortho on board  Pain control    5/20/2025  Continue pain control with oxycodone and Skelaxin.  IV Dilaudid as needed for breakthrough pain  Continuous pulse oximetry monitoring to monitor for hypoxia and respiratory depression while receiving IV narcotic medications.    5/21/2025  Continue pain control.    Trauma- (present on admission)  Assessment & Plan  As per presentation following ground-level fall.  With right acetabular fracture.    GI bleeding  Assessment & Plan  Bloody stool  Ceftriaxone and PPI twice daily  Hemoglobin every 8 hours  GI was consulted    5/21/2025  Discontinue ceftriaxone as low suspicion for esophageal variceal bleeding.    Acute blood loss anemia (ABLA)  Assessment & Plan  Underwent embolization by IR for intrapelvic bleeding  Patient developed lower GI bleeding  Ceftriaxone  GI was consulted  Monitor hemoglobin every 8 hours and blood transfusion    5/21/2025  Continue to monitor hemoglobin every 8 hours and transfuse for hemoglobin less than 7.  Patient is at high risk of rebleeding due to necrotic mass in the colon.    Leukocytosis- (present on admission)  Assessment & Plan  Sepsis workup including blood culture pending    Lactic acid acidosis  Assessment & Plan  Improvement in lactic acidosis however still lactic is elevated  Continue IV fluid  Sepsis workup.    Fall  Assessment & Plan  Mechanical fall  PT and OT  Pain control  Trauma board  Patient is at high risk for surgery, orthopedic did not recommend intervention    Prolonged Q-T interval on ECG- (present on admission)  Assessment & Plan  5/20/2025  Avoid QTc prolonging medications    Coagulopathy (HCC)- (present on admission)  Assessment & Plan  Due to cirrhosis with INR more than 1.8  Consider vitamin K   INR daily    5/20/2025  Continue to monitor INR    Benign prostatic hyperplasia with urinary frequency- (present on admission)  Assessment & Plan  Bladder scan and Flomax if  needed    Contraindication to deep vein thrombosis (DVT) prophylaxis- (present on admission)  Assessment & Plan  5/20/2025  Due to ongoing lower GI bleed    Thrombocytopenia (HCC)- (present on admission)  Assessment & Plan  Due to cirrhosis  101    Syncopal episodes- (present on admission)  Assessment & Plan  5/20/2025  As per presentation    Acute kidney injury superimposed on chronic kidney disease (HCC)- (present on admission)  Assessment & Plan  His creatinine around 1.4 and came with creatinine 2.2  Improved with IV fluid  Bladder scan to rule out retention  Avoid nephrotoxic medications  Continue IV fluid    5/20/2025  Creatinine improved to 1.69.  Continue NS at 83 mL/h.    5/21/2025  Creatinine improving to 1.4.  Continue NS at 83 mL/h.    Cirrhosis of liver with ascites (HCC)- (present on admission)  Assessment & Plan  History of cirrhosis due to alcoholism  INR 1.8 and bilirubin more than 2  MELD 24  Check ammonia and started lactulose  No volume overload, start with IV fluid  Suspicious of GI bleeding, start with ceftriaxone and PPI  GI was consulted    5/20/2025  Monitor fluid status very closely.  Not enough ascites for paracentesis.    5/21/2025  Discontinue ceftriaxone due to low suspicion for esophageal variceal bleeding.  Continue to hold home furosemide and spironolactone.    Hematemesis with nausea- (present on admission)  Assessment & Plan  5/21/2025  Resolved         VTE prophylaxis:   SCDs/TEDs   pharmacologic prophylaxis contraindicated due to Requiring transfusion GI bleed      I have performed a physical exam and reviewed and updated ROS and Plan today (5/21/2025). In review of yesterday's note (5/20/2025), there are no changes except as documented above.      53 minutes spent prepping to see patient (e.g. review of tests) obtaining and/or reviewing separately obtained history. Performing a medically appropriate examination and evaluation.  Counseling and educating the  patient/family/caregiver.  Ordering medications, tests, or procedures.  Referring and communicating with other health care professionals.  Documenting clinical information in EPIC.  Independently interpreting results and communicating results to patient/family/caregiver.  Care coordination.

## 2025-05-23 ENCOUNTER — APPOINTMENT (OUTPATIENT)
Dept: RADIOLOGY | Facility: MEDICAL CENTER | Age: 75
DRG: 270 | End: 2025-05-23
Attending: PHYSICIAN ASSISTANT
Payer: MEDICARE

## 2025-05-23 VITALS
DIASTOLIC BLOOD PRESSURE: 62 MMHG | SYSTOLIC BLOOD PRESSURE: 137 MMHG | BODY MASS INDEX: 30 KG/M2 | TEMPERATURE: 97.3 F | WEIGHT: 197.97 LBS | HEIGHT: 68 IN | OXYGEN SATURATION: 98 % | RESPIRATION RATE: 16 BRPM | HEART RATE: 78 BPM

## 2025-05-23 PROBLEM — E86.0 ACUTE DEHYDRATION: Status: ACTIVE | Noted: 2025-05-23

## 2025-05-23 PROBLEM — E87.20 LACTIC ACID ACIDOSIS: Status: RESOLVED | Noted: 2025-05-19 | Resolved: 2025-05-23

## 2025-05-23 PROBLEM — D62 ACUTE BLOOD LOSS ANEMIA (ABLA): Status: RESOLVED | Noted: 2025-05-19 | Resolved: 2025-05-23

## 2025-05-23 PROBLEM — K55.049: Status: ACTIVE | Noted: 2025-05-23

## 2025-05-23 PROBLEM — R57.8 HEMORRHAGIC SHOCK (HCC): Status: RESOLVED | Noted: 2025-05-17 | Resolved: 2025-05-23

## 2025-05-23 PROBLEM — K52.9 COLITIS: Status: ACTIVE | Noted: 2025-05-23

## 2025-05-23 PROBLEM — R57.1 HYPOVOLEMIC SHOCK (HCC): Status: ACTIVE | Noted: 2025-05-23

## 2025-05-23 PROBLEM — R57.8 HEMORRHAGIC SHOCK (HCC): Status: ACTIVE | Noted: 2025-05-17

## 2025-05-23 PROBLEM — D68.9 COAGULOPATHY (HCC): Status: RESOLVED | Noted: 2025-05-17 | Resolved: 2025-05-23

## 2025-05-23 PROBLEM — R57.1 HYPOVOLEMIC SHOCK (HCC): Status: RESOLVED | Noted: 2025-05-23 | Resolved: 2025-05-23

## 2025-05-23 PROBLEM — Z53.09 CONTRAINDICATION TO DEEP VEIN THROMBOSIS (DVT) PROPHYLAXIS: Status: RESOLVED | Noted: 2025-05-17 | Resolved: 2025-05-23

## 2025-05-23 PROBLEM — K70.31 ALCOHOLIC CIRRHOSIS OF LIVER WITH ASCITES (HCC): Status: ACTIVE | Noted: 2025-05-17

## 2025-05-23 PROBLEM — K92.2 GI BLEEDING: Status: RESOLVED | Noted: 2025-05-19 | Resolved: 2025-05-23

## 2025-05-23 PROBLEM — K55.039 ACUTE ISCHEMIC COLITIS (HCC): Status: RESOLVED | Noted: 2025-05-23 | Resolved: 2025-05-23

## 2025-05-23 PROBLEM — K55.039 ACUTE ISCHEMIC COLITIS (HCC): Status: ACTIVE | Noted: 2025-05-23

## 2025-05-23 PROBLEM — R55 SYNCOPAL EPISODES: Status: RESOLVED | Noted: 2025-05-17 | Resolved: 2025-05-23

## 2025-05-23 PROBLEM — N17.9 ACUTE KIDNEY INJURY SUPERIMPOSED ON CHRONIC KIDNEY DISEASE (HCC): Status: RESOLVED | Noted: 2025-05-17 | Resolved: 2025-05-23

## 2025-05-23 PROBLEM — F10.21 ALCOHOL DEPENDENCE IN REMISSION (HCC): Status: ACTIVE | Noted: 2025-05-23

## 2025-05-23 PROBLEM — E86.0 ACUTE DEHYDRATION: Status: RESOLVED | Noted: 2025-05-23 | Resolved: 2025-05-23

## 2025-05-23 PROBLEM — K92.0 HEMATEMESIS WITH NAUSEA: Status: RESOLVED | Noted: 2025-05-17 | Resolved: 2025-05-23
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
## 2025-05-23 PROBLEM — N18.9 ACUTE KIDNEY INJURY SUPERIMPOSED ON CHRONIC KIDNEY DISEASE (HCC): Status: RESOLVED | Noted: 2025-05-17 | Resolved: 2025-05-23

## 2025-05-23 LAB
ALBUMIN SERPL BCP-MCNC: 2.5 G/DL (ref 3.2–4.9)
ALBUMIN/GLOB SERPL: 1.1 G/DL
ALP SERPL-CCNC: 106 U/L (ref 30–99)
ALT SERPL-CCNC: 18 U/L (ref 2–50)
ANION GAP SERPL CALC-SCNC: 8 MMOL/L (ref 7–16)
AST SERPL-CCNC: 32 U/L (ref 12–45)
BACTERIA BLD CULT: ABNORMAL
BACTERIA BLD CULT: ABNORMAL
BASOPHILS # BLD AUTO: 0.2 % (ref 0–1.8)
BASOPHILS # BLD: 0.03 K/UL (ref 0–0.12)
BILIRUB SERPL-MCNC: 3 MG/DL (ref 0.1–1.5)
BUN SERPL-MCNC: 24 MG/DL (ref 8–22)
CALCIUM ALBUM COR SERPL-MCNC: 9.1 MG/DL (ref 8.5–10.5)
CALCIUM SERPL-MCNC: 7.9 MG/DL (ref 8.5–10.5)
CHLORIDE SERPL-SCNC: 113 MMOL/L (ref 96–112)
CO2 SERPL-SCNC: 19 MMOL/L (ref 20–33)
CREAT SERPL-MCNC: 1.03 MG/DL (ref 0.5–1.4)
EOSINOPHIL # BLD AUTO: 0.28 K/UL (ref 0–0.51)
EOSINOPHIL NFR BLD: 2.1 % (ref 0–6.9)
ERYTHROCYTE [DISTWIDTH] IN BLOOD BY AUTOMATED COUNT: 73.5 FL (ref 35.9–50)
GFR SERPLBLD CREATININE-BSD FMLA CKD-EPI: 76 ML/MIN/1.73 M 2
GLOBULIN SER CALC-MCNC: 2.3 G/DL (ref 1.9–3.5)
GLUCOSE BLD STRIP.AUTO-MCNC: 147 MG/DL (ref 65–99)
GLUCOSE BLD STRIP.AUTO-MCNC: 81 MG/DL (ref 65–99)
GLUCOSE SERPL-MCNC: 83 MG/DL (ref 65–99)
HCT VFR BLD AUTO: 25.4 % (ref 42–52)
HGB BLD-MCNC: 8.5 G/DL (ref 14–18)
IMM GRANULOCYTES # BLD AUTO: 0.23 K/UL (ref 0–0.11)
IMM GRANULOCYTES NFR BLD AUTO: 1.7 % (ref 0–0.9)
INR PPP: 1.61 (ref 0.87–1.13)
LYMPHOCYTES # BLD AUTO: 1.35 K/UL (ref 1–4.8)
LYMPHOCYTES NFR BLD: 10.2 % (ref 22–41)
MAGNESIUM SERPL-MCNC: 2.3 MG/DL (ref 1.5–2.5)
MCH RBC QN AUTO: 34.8 PG (ref 27–33)
MCHC RBC AUTO-ENTMCNC: 33.5 G/DL (ref 32.3–36.5)
MCV RBC AUTO: 104.1 FL (ref 81.4–97.8)
MONOCYTES # BLD AUTO: 2.1 K/UL (ref 0–0.85)
MONOCYTES NFR BLD AUTO: 15.8 % (ref 0–13.4)
NEUTROPHILS # BLD AUTO: 9.26 K/UL (ref 1.82–7.42)
NEUTROPHILS NFR BLD: 70 % (ref 44–72)
NRBC # BLD AUTO: 0.02 K/UL
NRBC BLD-RTO: 0.2 /100 WBC (ref 0–0.2)
PLATELET # BLD AUTO: 90 K/UL (ref 164–446)
PLATELETS.RETICULATED NFR BLD AUTO: 1 % (ref 0.6–13.1)
PMV BLD AUTO: 9.7 FL (ref 9–12.9)
POTASSIUM SERPL-SCNC: 4 MMOL/L (ref 3.6–5.5)
PROT SERPL-MCNC: 4.8 G/DL (ref 6–8.2)
PROTHROMBIN TIME: 19.2 SEC (ref 12–14.6)
RBC # BLD AUTO: 2.44 M/UL (ref 4.7–6.1)
SIGNIFICANT IND 70042: ABNORMAL
SITE SITE: ABNORMAL
SODIUM SERPL-SCNC: 140 MMOL/L (ref 135–145)
SOURCE SOURCE: ABNORMAL
WBC # BLD AUTO: 13.3 K/UL (ref 4.8–10.8)

## 2025-05-23 PROCEDURE — 36415 COLL VENOUS BLD VENIPUNCTURE: CPT

## 2025-05-23 PROCEDURE — 83735 ASSAY OF MAGNESIUM: CPT

## 2025-05-23 PROCEDURE — 71045 X-RAY EXAM CHEST 1 VIEW: CPT

## 2025-05-23 PROCEDURE — 700111 HCHG RX REV CODE 636 W/ 250 OVERRIDE (IP)

## 2025-05-23 PROCEDURE — 700111 HCHG RX REV CODE 636 W/ 250 OVERRIDE (IP): Performed by: STUDENT IN AN ORGANIZED HEALTH CARE EDUCATION/TRAINING PROGRAM

## 2025-05-23 PROCEDURE — 700102 HCHG RX REV CODE 250 W/ 637 OVERRIDE(OP)

## 2025-05-23 PROCEDURE — A9270 NON-COVERED ITEM OR SERVICE: HCPCS

## 2025-05-23 PROCEDURE — 80053 COMPREHEN METABOLIC PANEL: CPT

## 2025-05-23 PROCEDURE — 700105 HCHG RX REV CODE 258: Performed by: STUDENT IN AN ORGANIZED HEALTH CARE EDUCATION/TRAINING PROGRAM

## 2025-05-23 PROCEDURE — 82962 GLUCOSE BLOOD TEST: CPT

## 2025-05-23 PROCEDURE — 85610 PROTHROMBIN TIME: CPT

## 2025-05-23 PROCEDURE — 85055 RETICULATED PLATELET ASSAY: CPT

## 2025-05-23 PROCEDURE — 85025 COMPLETE CBC W/AUTO DIFF WBC: CPT

## 2025-05-23 PROCEDURE — 99239 HOSP IP/OBS DSCHRG MGMT >30: CPT | Performed by: STUDENT IN AN ORGANIZED HEALTH CARE EDUCATION/TRAINING PROGRAM

## 2025-05-23 RX ORDER — POLYETHYLENE GLYCOL 3350 17 G/17G
17 POWDER, FOR SOLUTION ORAL 2 TIMES DAILY
Status: SHIPPED
Start: 2025-05-23

## 2025-05-23 RX ORDER — HYDROCORTISONE ACETATE 25 MG/1
25 SUPPOSITORY RECTAL EVERY 12 HOURS
Status: SHIPPED
Start: 2025-05-23

## 2025-05-23 RX ORDER — CIPROFLOXACIN 500 MG/1
500 TABLET, FILM COATED ORAL 2 TIMES DAILY
Qty: 6 TABLET | Refills: 0 | Status: ACTIVE | DISCHARGE
Start: 2025-05-24 | End: 2025-05-26

## 2025-05-23 RX ORDER — METRONIDAZOLE 500 MG/1
500 TABLET ORAL EVERY 12 HOURS
Status: ACTIVE | DISCHARGE
Start: 2025-05-23 | End: 2025-05-26

## 2025-05-23 RX ORDER — OMEPRAZOLE 20 MG/1
20 CAPSULE, DELAYED RELEASE ORAL DAILY
Status: SHIPPED
Start: 2025-05-23

## 2025-05-23 RX ORDER — DOCUSATE SODIUM 100 MG/1
100 CAPSULE, LIQUID FILLED ORAL 2 TIMES DAILY
Status: DISCONTINUED | OUTPATIENT
Start: 2025-05-23 | End: 2025-05-23 | Stop reason: HOSPADM

## 2025-05-23 RX ORDER — BACITRACIN ZINC AND POLYMYXIN B SULFATE 500; 1000 [USP'U]/G; [USP'U]/G
1 OINTMENT TOPICAL 3 TIMES DAILY
Status: ACTIVE | DISCHARGE
Start: 2025-05-23

## 2025-05-23 RX ORDER — POLYETHYLENE GLYCOL 3350 17 G/17G
1 POWDER, FOR SOLUTION ORAL 2 TIMES DAILY
Status: DISCONTINUED | OUTPATIENT
Start: 2025-05-23 | End: 2025-05-23 | Stop reason: HOSPADM

## 2025-05-23 RX ORDER — GABAPENTIN 100 MG/1
100 CAPSULE ORAL EVERY 8 HOURS
Status: SHIPPED
Start: 2025-05-23

## 2025-05-23 RX ORDER — PSEUDOEPHEDRINE HCL 30 MG
100 TABLET ORAL 2 TIMES DAILY
Status: SHIPPED
Start: 2025-05-23

## 2025-05-23 RX ORDER — LIDOCAINE 4 G/G
1 PATCH TOPICAL EVERY 24 HOURS
Status: SHIPPED
Start: 2025-05-23

## 2025-05-23 RX ORDER — METRONIDAZOLE 500 MG/1
500 TABLET ORAL EVERY 12 HOURS
Status: ACTIVE | DISCHARGE
Start: 2025-05-23 | End: 2025-05-23

## 2025-05-23 RX ORDER — METAXALONE 400 MG/1
400 TABLET ORAL 2 TIMES DAILY
Status: SHIPPED
Start: 2025-05-23

## 2025-05-23 RX ORDER — METRONIDAZOLE 500 MG/100ML
500 INJECTION, SOLUTION INTRAVENOUS EVERY 12 HOURS
Status: DISCONTINUED | OUTPATIENT
Start: 2025-05-23 | End: 2025-05-23 | Stop reason: HOSPADM

## 2025-05-23 RX ADMIN — CEFTRIAXONE SODIUM 2000 MG: 10 INJECTION, POWDER, FOR SOLUTION INTRAVENOUS at 11:44

## 2025-05-23 RX ADMIN — GABAPENTIN 100 MG: 100 CAPSULE ORAL at 12:44

## 2025-05-23 RX ADMIN — PANTOPRAZOLE SODIUM 40 MG: 40 INJECTION, POWDER, FOR SOLUTION INTRAVENOUS at 05:04

## 2025-05-23 RX ADMIN — METRONIDAZOLE 500 MG: 500 INJECTION, SOLUTION INTRAVENOUS at 08:53

## 2025-05-23 RX ADMIN — METAXALONE 400 MG: 800 TABLET ORAL at 05:04

## 2025-05-23 RX ADMIN — GABAPENTIN 100 MG: 100 CAPSULE ORAL at 05:04

## 2025-05-23 NOTE — CARE PLAN
The patient is Stable - Low risk of patient condition declining or worsening    Shift Goals  Clinical Goals: safety, increase mobility, skin integrity  Patient Goals: comfort  Family Goals: ANGIE    Progress made toward(s) clinical / shift goals:        Problem: Knowledge Deficit - Standard  Goal: Patient and family/care givers will demonstrate understanding of plan of care, disease process/condition, diagnostic tests and medications  Outcome: Progressing  Note: 1. Patient and family/caregiver oriented to unit, equipment, visitation policy and means for communicating concern2. Complete/review Learning Assessment3. Assess knowledge level of disease process/condition, treatment plan, diagnostic tests and medications4. Explain disease process/condition, treatment plan, diagnostic tests and medications   Patient alert and oriented, discussed plan of care and patient verbalized understanding, continued with skin integrity, q2 hour turns, pillows in use for support and positioning.       Problem: Skin Integrity  Goal: Skin integrity is maintained or improved  Outcome: Progressing  Note: 1. Assess and monitor skin integrity, appearance and/or temperature2. Assess risk factors for impaired skin integrity and/or pressures ulcers3. Implement precautions to protect skin integrity in collaboration with interdisciplinary team4. Implement pressure ulcer prevention protocol if at risk for skin breakdown5. Confirm wound care consult if at risk for skin breakdown6. Ensure patient use of pressure relieving devices (Low air loss bed, waffle overlay, heel protectors, ROHO cushion, etc)   Patient on q2 hour turns, pillows in use for support and positioning, bruising to groin, hib, back and pelvic area. Cleaned for incontinence, barrier cream applied to sacrum, continued to assess for incontinence to promote skin integrity,      Problem: Fall Risk  Goal: Patient will remain free from falls  Outcome: Progressing  Note: Bed alarm on, call  light within reach, patient demonstrates use, bed in low and locked position.        Patient is not progressing towards the following goals: n/a

## 2025-05-23 NOTE — DISCHARGE SUMMARY
Discharge Summary    CHIEF COMPLAINT ON ADMISSION  Syncope with ground-level fall, right hip pain    Reason for Admission  Hypovolemic shock    Admission Date  5/17/2025     CODE STATUS  Full Code    HPI & HOSPITAL COURSE  Evelio Donnell Pressley is a 74-year-old male who presented on 5/17/2025 with multiple ground-level falls.  This is a pleasant gentleman with a history of osteoarthritis and alcoholic cirrhosis with ascites on spironolactone and furosemide, alcohol dependence in remission 3 years, chronic nonocclusive thrombus in the common femoral artery right lower extremity, who initially presented as a trauma activation following his syncope and ground-level fall.  Per trauma team evaluation, patient was found to have a acute right acetabular fracture and active pelvic extravascular bleeding.  Interventional radiology was consulted and the patient underwent embolization of the right iliac artery on 5/17/2025.  Patient was transfused multiple units of platelets, PRBC, and cryoprecipitate based on PEG results.  Orthopedic surgery was consulted and did not recommend any surgical intervention.  Recommended nonweightbearing with motion as tolerated the right lower extremity.    The patient's daughter lives with the patient at home.  Denied any recurrent falls.  They were working outside in the hot weather and the patient may have been dehydrated as he started to have dizziness on the following morning.  Patient at baseline is independent with no significant history of dementia.    Patient was started on ceftriaxone as well as IV pantoprazole due to concern for variceal bleeding initially, which was subsequently discontinued.  Patient was medically stabilized.  Hospital medicine service was consulted on 5/19/2025 and subsequently transferred to their service.  Patient started to have large bloody bowel movement with hemoglobin dropping from 8.4 down to 6.6 improving to 7.0.  He was noted to have total of 6 units of  PRBC transfused since admission.  Recommendation was to start rifaximin, which was subsequently discontinued.  Hold furosemide for acute kidney injury and lactulose for his ongoing diarrhea.  Recommended paracentesis.  However, the ultrasound showed small amount of free fluid in the abdomen not amendable to safe percutaneous access.  Due to ongoing hematochezia, patient ultimately underwent a colonoscopy on 5/21/2025 with findings consistent with ischemia of the right side of colon with necrosis.  Biopsies of the right colon mass were consistent with ischemic colitis and ulceration.  Notable for internal hemorrhoids grade 2.  No evidence of invasive carcinoma within the sampled tissue.  Sigmoid colon polyps x 2 were consistent with tubular adenomas.  Biopsy of the descending colon was also consistent with ischemic colitis with ulceration.  It was felt that the patient's ischemic colitis may have been due to his acute dehydration on presentation versus recent embolization procedure.    Patient's home spironolactone and furosemide was resumed upon discharge.  GI recommended continue take lactulose as needed to avoid encephalopathy.    Due to persistently elevated white count with elevated procalcitonin level concerning for bacterial colitis in setting of ischemic colitis, patient was initiated on ceftriaxone and metronidazole intravenously, which was transition to oral ciprofloxacin and metronidazole.    Therefore, he is discharged in good and stable condition to an inpatient rehabilitation hospital.    The patient met 2-midnight criteria for an inpatient stay at the time of discharge.      FOLLOW UP ITEMS POST DISCHARGE  -Follow-up primary care provider following discharge from [facility].  -Follow-up with [facility].  -Follow-up with gastroenterology for repeat colonoscopy in 1 year    DISCHARGE DIAGNOSES  Principal Problem (Resolved):    Hemorrhagic shock (HCC) (POA:  Yes)  Active Problems:    Trauma (POA: Yes)    Closed fracture of right acetabulum (HCC) (POA: Yes)    Alcoholic cirrhosis of liver with ascites (HCC) (POA: Yes)    Thrombocytopenia (HCC) (POA: Yes)    Benign prostatic hyperplasia with urinary frequency (Chronic) (POA: Yes)    Prolonged Q-T interval on ECG (POA: Yes)    Leukocytosis (POA: Yes)    Acute blood loss anemia (ABLA) (POA: Yes)    Alcohol dependence in remission (HCC) (POA: Unknown)    Colitis (POA: Yes)  Resolved Problems:    Acute ischemic colitis (HCC) (POA: Yes)    Acute dehydration (POA: Yes)    Hypovolemic shock (HCC) (POA: Yes)    Hematemesis with nausea (POA: Yes)    Acute kidney injury superimposed on chronic kidney disease (HCC) (POA: Yes)    Syncopal episodes (POA: Yes)    Contraindication to deep vein thrombosis (DVT) prophylaxis (POA: Yes)    Coagulopathy (HCC) (POA: Yes)    Encounter for geriatric assessment (POA: Yes)    Fall (POA: Unknown)    Lactic acid acidosis (POA: Unknown)    GI bleeding (POA: Yes)      FOLLOW UP  No future appointments.  [facility]  [address] [facility] [id number] [phone]        Viola Yoder D.O.  [address]  [place] [address]  [phone]    Follow up        MEDICATIONS ON DISCHARGE     Medication List        PAUSE taking these medications        Instructions   Aleve 220 MG Caps  Wait to take this until your doctor or other care provider tells you to start again.  Generic drug: Naproxen Sodium   Take 220 mg by mouth Once.  Dose: 220 mg            START taking these medications        Instructions   bacitracin-polymyxin b 500-90306 UNIT/GM Oint  Commonly known as: Polysporin   Apply 1 Each topically 3 times a day.  Dose: 1 Each     ciprofloxacin 500 MG Tabs  Start taking on: May 24, 2025  Commonly known as: Cipro   Take 1 Tablet by mouth 2 times a day for 3 days.  Dose: 500 mg     docusate sodium 100 MG Caps   Take 100 mg by mouth 2 times a day.  Dose: 100  "mg     gabapentin 100 MG Caps  Commonly known as: Neurontin   Take 1 Capsule by mouth every 8 hours.  Dose: 100 mg     hydrocortisone 25 MG Supp  Commonly known as: Anusol-HC   Insert 1 Suppository into the rectum every 12 hours.  Dose: 25 mg     lidocaine 4 % Ptch  Commonly known as: Asperflex   Place 1 Patch on the skin every 24 hours.  Dose: 1 Patch     metaxalone 400 MG Tabs  Commonly known as: Skelaxin   Take 1 Tablet by mouth 2 times a day.  Dose: 400 mg     metroNIDAZOLE 500 MG Tabs  Commonly known as: Flagyl   Take 1 Tablet by mouth every 12 hours for 3 days.  Dose: 500 mg     omeprazole 20 MG delayed-release capsule  Commonly known as: PriLOSEC   Take 1 Capsule by mouth every day.  Dose: 20 mg     polyethylene glycol/lytes 17 g Pack  Commonly known as: Miralax   Take 1 Packet by mouth 2 times a day.  Dose: 17 g            CONTINUE taking these medications        Instructions   bismuth subsalicylate 262 MG Chew  Commonly known as: Pepto-Bismol   Chew 262 mg Once.  Dose: 262 mg     CALCIUM PO   Take 2 Tablets by mouth every morning. \"Chewable\"  Dose: 2 Tablet     folic acid 800 MCG tablet  Commonly known as: Folvite   Take 800 mcg by mouth every morning.  Dose: 800 mcg     furosemide 40 MG Tabs  Commonly known as: Lasix   Take 40 mg by mouth every morning.  Dose: 40 mg     MILK THISTLE PO   Take 1 Capsule by mouth 2 times a day.  Dose: 1 Capsule     Multi Adult Gummies Chew   Chew 2 Tablets every morning.  Dose: 2 Tablet     potassium chloride 10 MEQ capsule  Commonly known as: Micro-K   Take 10 mEq by mouth 2 times a day.  Dose: 10 mEq     spironolactone 100 MG Tabs  Commonly known as: Aldactone   Take 100 mg by mouth every morning.  Dose: 100 mg     thiamine 100 MG Tabs  Commonly known as: Vitamin B-1   Take 100 mg by mouth every morning.  Dose: 100 mg     VITAMIN C PO   Take 1 Tablet by mouth every morning. \"Chewable\"  Dose: 1 Tablet              Allergies  Allergies[1]    DIET  Orders Placed This " Encounter   Procedures    Diet Order Diet: 2 Gram Sodium     Standing Status:   Standing     Number of Occurrences:   1     Diet::   2 Gram Sodium [7]       ACTIVITY  As tolerated and directed by rehab.  Non-weight bearing RIGHT leg    LINES, DRAINS, AND WOUNDS  This is an automated list. Peripheral IVs will be removed prior to discharge.  Peripheral IV 05/23/25 22 G Anterior;Left Forearm (Active)       Wound 05/17/25 Other (Comments) Groin Proximal;Anterior Right Angio Sheath - 5 FR (Active)   Site Assessment Dry;Black;Light Purple;Purple;Edema;Fragile 05/23/25 0820   Periwound Assessment Pink;Purple;Black 05/23/25 0820   Margins ANGIE 05/22/25 2100   Closure Adhesive bandage 05/23/25 0820   Drainage Amount None 05/23/25 0820   Drainage Description Sanguineous 05/18/25 0400   Treatments Cleansed;Site care 05/18/25 2000   Wound Cleansing Soap and Water 05/19/25 0400   Moisture Containment Device Condom Catheter 05/23/25 0820   Dressing Status Clean;Dry;Intact 05/23/25 0820   Dressing Changed Observed 05/22/25 2100   Dressing Options Dry Gauze;Transparent Film 05/23/25 0820   Dressing Change/Treatment Frequency Daily, and As Needed 05/21/25 0912   NEXT Dressing Change/Treatment Date 05/20/25 05/18/25 2000       Wound 05/17/25 Pressure Injury Coccyx (Active)   Wound Image   05/18/25 0000   Site Assessment Pink;Red;Excoriated 05/23/25 0820   Periwound Assessment Dry;Clean;Intact 05/23/25 0820   Margins Defined edges;Unattached edges 05/23/25 0820   Closure Open to air 05/23/25 0820   Drainage Amount None 05/23/25 0820   Treatments Offloading 05/23/25 0820   Offloading/DME Other (comment) 05/23/25 0820   Wound Cleansing Foam Cleanser/Washcloth 05/23/25 0820   Periwound Protectant Barrier Paste 05/23/25 0820   Moisture Containment Device Condom Catheter 05/23/25 0820   Dressing Status Open to Air 05/23/25 0820   Dressing Changed Reapplied 05/23/25 0820   Dressing Change/Treatment Frequency Daily, and As Needed 05/23/25  0820       Peripheral IV 05/23/25 22 G Anterior;Left Forearm (Active)               MENTAL STATUS ON TRANSFER  Alert and x 3.  Appropriately conversational.          CONSULTATIONS  Gastroenterology  Trauma surgery  Orthopedic surgery  Interventional radiology  Hospital medicine services    PROCEDURES  Pelvic angiogram with right internal with right iliac artery anterior division Gelfoam embolization by Dr. Kike Fernandez of interventional radiology on 5/17/2025.      PREOPERATIVE DIAGNOSIS/INDICATION: hematochezia     POSTOPERATIVE DIAGNOSES: necrosis, ischemia of right side of colon     PROCEDURE: COLONOSCOPY with biopsy and snare polypecomty     PHYSICIAN: Taty Rios MD     CONSENT:  OBTAINED. The risks, benefits and alternatives of the procedure were discussed in details. The risks include and are not limited to bleeding, infection, perforation, missed lesions, and sedations risks (cardiopulmonary compromise and allergic reaction to medications).     ANESTHESIA:  Per anesthesiologist.     LOCATION: [facility]     DESCRIPTION:  The patient presented to the procedure room.  After routine checkup was performed, patient was brought into endoscopy suite.  Patient was placed on his left lateral decubitus position.  Patient was sedated by anesthesia. Vital signs were monitored throughout procedure.  Oxygenation support was provided throughout procedure. Digital rectal examination was performed.  Then, a colonoscope was inserted into patient's anus, advanced to the cecum under direct visualization. Both the ileocecal valve and appendiceal orifice were seen.  Once this site was reached and examined, the colonoscope was withdrawn slowly to rectum where retroflexion was performed. The scope was then fully removed from the rectum/anal canal and the  procedure was terminated. Withdrawal time was at least 6 minutes to ensure adequate examination.      The patient tolerated the procedure well.  There were no immediate  complications.     EBL: 5 cc        Digital rectal examination normal  Endoscope advanced to the cecum  [place] prep score:   Right colon: 3; Transverse colon: 3; Left Colon: 2. BPS score: 8     FINDINGS:  Rectum: normal  There was a 5 mm polyp in sigmoid colon, removed with cold snare polypectomy and sent to pathology.  There was was a 5 mm polyp in descending colon, removed with cold snare polypectomy and sent to pathology.  Also in the descending colon there was ulceration and erythema extending about 4 cm. Biopsied for ischemic colitis.  Transverse colon: evan;  In ascending colon there was severe ulceration and necrosis that extended 15 cm  and included cecum. It was very edematous and there was one area just distal to ileoceal valve that appeared mass like. Biopsies taken. Due to appearance of mass/versus polyp within this edema and necrosis, there were two areas that appeared like polyps. Both 1 cm in size that were removed cold snare polypectomy.   Retroflexion showed internal hemorrhoids, grade 2.         RECOMMENDATIONS:  Colon polyps  Internal hemorrhoids grade 2  Ischemia in right colon and descending colon  ? Mass in right colon versus edema     Await biopsy  CTA to assess vasculature due to severity  Anusol HC pr BID for 7 days  Discussed with Dr. Walsh      LABORATORY  Lab Results   Component Value Date    SODIUM 140 05/23/2025    POTASSIUM 4.0 05/23/2025    CHLORIDE 113 (H) 05/23/2025    CO2 19 (L) 05/23/2025    GLUCOSE 83 05/23/2025    BUN 24 (H) 05/23/2025    CREATININE 1.03 05/23/2025        Lab Results   Component Value Date    WBC 13.3 (H) 05/23/2025    HEMOGLOBIN 8.5 (L) 05/23/2025    HEMATOCRIT 25.4 (L) 05/23/2025    PLATELETCT 90 (L) 05/23/2025        Total time of the discharge process 36 minutes.         [1]   Allergies  Allergen Reactions    Penicillins Anaphylaxis     Per chart review patient has tolerated cefdinir, ceftriaxone, and cephalexin.

## 2025-05-23 NOTE — PROGRESS NOTES
Discharge Instructions    Discharged to other by medical transportation with escort. Discharged via ambulance, hospital escort: Yes.  Special equipment needed: Not Applicable    Be sure to schedule a follow-up appointment with your primary care doctor or any specialists as instructed.     Discharge Plan:   Diet Plan: Discussed  Activity Level: Discussed  Confirmed Follow up Appointment: Patient to Call and Schedule Appointment  Confirmed Symptoms Management: Discussed  Medication Reconciliation Updated: Yes    I understand that a diet low in cholesterol, fat, and sodium is recommended for good health. Unless I have been given specific instructions below for another diet, I accept this instruction as my diet prescription.   Other diet: 2G NA    Special Instructions: None    -Is this patient being discharged with medication to prevent blood clots?  No    Is patient discharged on Warfarin / Coumadin?   No     Patient discharged to [facility]. RN to RN report called to Leida all questions answered accordingly. Pt is alert and oriented x4, AVS instructions reviewed with daughter and pt at bedside. COBRA packet handed to transporter.All belongings with patient at time of discharge.

## 2025-05-23 NOTE — DISCHARGE PLANNING
Care Transition Team Final Discharge Disposition    Actual Discharge Information  Discharge Disposition: Disch to IP rehab facility or distinct part unit (62)    Case Management Discharge Planning    Admission Date: 5/17/2025  GMLOS: 6.4  ALOS: 6    6-Clicks ADL Score: 15  6-Clicks Mobility Score: 11  PT and/or OT Eval ordered: Yes  Post-acute Referrals Ordered: Yes  Post-acute Choice Obtained: Yes  Has referral(s) been sent to post-acute provider:  Yes      Anticipated Discharge Dispo: Discharge Disposition: Disch to IP rehab facility or distinct part unit (62)    DME Needed: No    Action(s) Taken: Updated Provider/Nurse on Discharge Plan, Transport Arranged , Authorization Received , and OTHER    [facility].  Gurney transport scheduled for 1600, pt's daughter aware.  Pt signed transfer form, cobra packet given to bedside RN.  Avoidable days entered.    Escalations Completed: Leadership  Received ASF approval from [facility] leadership for gurney transport as Ride Line coordinator notified SW that pt's insurance payor does not cover transport.    Medically Clear: Yes    Barriers to Discharge: None    Is the patient up for discharge tomorrow: No

## 2025-05-23 NOTE — DISCHARGE PLANNING
DC Transport Scheduled    Transport Company Scheduled:  [facility]    Scheduled Date: 5/23/2025  Scheduled Time: 1600    Transport Type: [facility]  Destination:   [facility]   Destination address: [address] [place] [address]    Notified care team of scheduled transport via Voalte.     If there are any changes needed to the DC transportation scheduled, please contact [facility] at ext. [phone] between the hours of [phone]. If outside those hours, contact the ED Case Manager at ext. [phone].

## 2025-05-23 NOTE — PROGRESS NOTES
Hospital Medicine Daily Progress Note    Date of Service  5/22/2025    Chief Complaint  Evelio Pressley is a 74 y.o. male admitted 5/17/2025 with ground-level fall and right acetabular fracture and acute pelvic extravascular bleeding requiring IR embolization.    Hospital Course  74-year-old male with history of osteoarthritis, alcohol abuse and cirrhosis who presented 5/17 with a ground-level fall.  Patient was evaluated by trauma and they found right acetabular fracture and active pelvic extravascular bleeding, IR was consulted and patient underwent embolization.  Ortho also was evaluated and no intervention recommended none weight bearing with motion as tolerated on the right lower extremity.     Daughter lives with the patient, denied any significant recurrent falls, patient has been medically stable, patient is independent and no significant history of dementia.     Internal medicine was consulted on 5/19, patient was evaluated and examined at bedside, patient very lethargic, confusing, patient was dry, patient had bloody stool, hemoglobin around 7, developed leukocytosis, sepsis protocol was ordered, GI was consulted.     Interval Problem Update  5/20/2025  Patient was seen and examined on the telemetry floor.  He continues on continuous cardiac monitoring.  Ongoing blood from the rectum x 3.  Hemoglobin dropping to 8.0 from 8.5.  Discussed with gastroenterology, planning for colonoscopy tomorrow.  Patient states that he has not drinking for 3 years.  Complaining of right leg pain today.    5/21/2025  Patient was seen and examined on the telemetry floor.  He continues on continuous cardiac monitoring.  Discussed with Dr. Rios of GI.  Large necrotic mass seen in the colon.  Biopsies obtained.  CT angiogram of the abdamen and pelvis ordered for assessment of vasculature.  Hemoglobin remaining stable.  Patient is alert and oriented x 3.  Discussed plan of care with the daughter at bedside.  On 1 LPM oxygen  by nasal cannula.   Continuing on normal saline 83 m/h.  Creatinine improving.  Holding home spironolactone and furosemide    5/22/2025  Patient was seen and examined on the SMT floor.  Discussed with gastroenterology, BECKY Guerrero.  Pathology of sigmoid colon polyps consistent with tubular adenomas.  Right colon mass biopsy consistent with ischemic type injury and ulceration.  No invasive adenocarcinoma within the sampled tissue.  Descending colon biopsy also consistent with ischemic colitis with ulceration.  The patient's daughter reports that the patient and herself were working outside in hot weather before the episode the following morning.  He may have been dehydrated.  He is tolerating a full liquid diet and advancing to regular diet.  Awaiting insurance authorization for [facility].      I have discussed this patient's plan of care and discharge plan at IDT rounds today with Case Management, Nursing, Nursing leadership, and other members of the IDT team.    Consultants/Specialty  GI and trauma    Code Status  Full Code    Disposition  The patient is not medically cleared for discharge to home or a post-acute facility.  Anticipate discharge to: an inpatient rehabilitation hospital    I have placed the appropriate orders for post-discharge needs.    Review of Systems  Review of Systems   Constitutional:  Negative for chills and fever.   Respiratory:  Negative for cough and shortness of breath.    Cardiovascular:  Negative for chest pain and palpitations.   Gastrointestinal:  Negative for abdominal pain, blood in stool, constipation, diarrhea, nausea and vomiting.   Musculoskeletal:  Positive for joint pain. Negative for myalgias.   Neurological:  Positive for weakness. Negative for dizziness and headaches.        Physical Exam  Temp:  [36.3 °C (97.3 °F)-36.4 °C (97.5 °F)] 36.4 °C (97.5 °F)  Pulse:  [69-88] 82  Resp:  [17-18] 17  BP: (126-142)/(59-69)  126/59  SpO2:  [94 %-97 %] 97 %    Physical Exam  Vitals and nursing note reviewed. Exam conducted with a chaperone present.   Constitutional:       General: He is not in acute distress.     Appearance: He is ill-appearing.   HENT:      Head: Normocephalic and atraumatic.      Mouth/Throat:      Mouth: Mucous membranes are moist.      Pharynx: Oropharynx is clear. No oropharyngeal exudate.   Eyes:      General: No scleral icterus.        Right eye: No discharge.         Left eye: No discharge.      Conjunctiva/sclera: Conjunctivae normal.   Cardiovascular:      Rate and Rhythm: Normal rate and regular rhythm.      Pulses: Normal pulses.      Heart sounds: Normal heart sounds. No murmur heard.  Pulmonary:      Effort: Pulmonary effort is normal. No respiratory distress.      Breath sounds: Normal breath sounds.   Abdominal:      General: Abdomen is flat. Bowel sounds are normal. There is no distension.      Palpations: Abdomen is soft.   Musculoskeletal:         General: No swelling.      Cervical back: Neck supple. No tenderness.      Right lower leg: No edema.      Left lower leg: No edema.   Skin:     General: Skin is warm and dry.      Coloration: Skin is pale.   Neurological:      Mental Status: He is alert and oriented to person, place, and time. Mental status is at baseline.      Motor: Weakness (Right lower extremity) present.   Psychiatric:         Thought Content: Thought content normal.         Judgment: Judgment normal.         Fluids    Intake/Output Summary (Last 24 hours) at 5/22/2025 1809  Last data filed at 5/22/2025 1600  Gross per 24 hour   Intake 980 ml   Output 850 ml   Net 130 ml        Laboratory  Recent Labs     05/21/25  0517 05/21/25  1214 05/21/25  2342 05/22/25  0626 05/22/25  1235   WBC 13.2*  --  13.3* 15.8*  --    RBC 2.24*  --  2.34* 2.32*  --    HEMOGLOBIN 7.8*   < > 8.3* 8.2* 9.2*   HEMATOCRIT 22.7*   < > 24.9* 24.5* 27.4*   .3*  --  106.4* 105.6*  --    MCH 34.8*  --  35.5*  35.3*  --    MCHC 34.4  --  33.3 33.5  --    RDW 72.2*  --  74.5* 73.6*  --    PLATELETCT 90*  --  88* 96*  --    MPV 9.9  --  10.3 9.9  --     < > = values in this interval not displayed.     Recent Labs     05/21/25  0002 05/21/25  2342 05/22/25  0625   SODIUM 137 137 138   POTASSIUM 4.3 4.3 4.3   CHLORIDE 110 111 111   CO2 18* 17* 17*   GLUCOSE 93 127* 98   BUN 37* 30* 27*   CREATININE 1.40 1.25 1.17   CALCIUM 8.2* 8.2* 8.3*     Recent Labs     05/20/25  0503 05/21/25  0517 05/22/25  0626   INR 1.79* 1.66* 1.54*               Imaging  DX-CHEST-PORTABLE (1 VIEW)   Final Result      Linear atelectasis in the left lower lung. No focal consolidation. No effusions.         CTA ABDOMEN PELVIS W & W/O POST PROCESS   Final Result         1.  No visualized aneurysm or dissection.   2.  Small layering bilateral pleural effusions.   3.  Linear densities in the bilateral lung bases favor changes of atelectasis.   4.  Cholelithiasis.   5.  Mild upper abdominal ascites.   6.  Diverticulosis.   7.  Umbilical hernia containing small bowel loops without visualized obstructive changes.   8.  Left inguinal hernia containing ascites and intermediate density fluid, could represent component of hemorrhage.   9.  Right acetabular fracture with adjacent fluid collection, likely representing evolving hematoma, abscess is not excluded.   10.  Atherosclerosis and atherosclerotic coronary artery disease.      DX-CHEST-PORTABLE (1 VIEW)   Final Result      No acute cardiopulmonary abnormality.      US-ABDOMEN LTD (SOFT TISSUE)   Final Result      Small amount of free fluid in the abdomen, not amenable to safe percutaneous access.         DX-CHEST-PORTABLE (1 VIEW)   Final Result      1.  Slight improvement in lung volumes with bibasilar atelectasis.   2.  The remainder is stable.      DX-PELVIS-TRAUMA SERIES  3-   Final Result      Right acetabular fracture redemonstrated with grossly unchanged alignment.      DX-CHEST-PORTABLE (1 VIEW)    Final Result      1.  Subsegmental atelectasis in the left lower lobe.   2.  Atherosclerosis.   3.  The remainder of the chest radiography is within normal limits.      US-TRAUMA VEIN SCREEN LOWER BILAT EXTREMITY   Final Result      IR-EMBOLIZATION   Final Result      1.  Pelvic angiogram demonstrating no evidence of active extravasation, pseudoaneurysm or truncated vessel.   2.  Empiric Gelfoam embolization of the right internal iliac artery anterior division.   3.  Right common femoral arteriogram demonstrating appropriate access over the femoral head.   4.  Left internal iliac artery angiogram demonstrating no evidence of active extravasation, pseudoaneurysm or truncated vessel. Empiric embolization was not performed on the left due to lack of conventional angiographic findings which correlates with    recent CT findings demonstrating no evidence of injury in this region.         CT-CSPINE WITHOUT PLUS RECONS   Final Result      No acute fracture is identified.      DX-PELVIS-1 OR 2 VIEWS   Final Result      Right acetabular fracture.           Assessment/Plan  * Hypovolemic shock (HCC)- (present on admission)  Assessment & Plan  Needed IR embolization  Blood transfusion if needed less than 7  Hemoglobin every 8 hours  Patient needs blood transfusion    5/20/2025  Continuous cardiac monitoring.  Continue to check hemoglobin every 6 hours and transfuse for hemoglobin less than 7 or hemodynamic instability.    5/21/2025  Likely bleeding from colon mass noted on colonoscopy.  Discussed with Dr. Rios of gastroenterology.  CT angiogram ordered for evaluation of vasculature.    5/22/2025  Discussed with gastroenterology, BECKY Guerrero.  Pathology of sigmoid colon polyps consistent with tubular adenomas.  Right colon mass biopsy consistent with ischemic type injury and ulceration.  No invasive adenocarcinoma within the sampled tissue.  Descending colon biopsy also consistent with ischemic colitis with  ulceration.  The patient's daughter reports that the patient and herself were working outside in hot weather before the episode the following morning.  He may have been dehydrated.  He is tolerating a full liquid diet and advancing to regular diet.    Closed fracture of right acetabulum (HCC)- (present on admission)  Assessment & Plan  Ortho on board  Pain control    5/20/2025  Continue pain control with oxycodone and Skelaxin.  IV Dilaudid as needed for breakthrough pain  Continuous pulse oximetry monitoring to monitor for hypoxia and respiratory depression while receiving IV narcotic medications.    5/21/2025  Continue pain control.    Trauma- (present on admission)  Assessment & Plan  As per presentation following ground-level fall.  With right acetabular fracture.    GI bleeding  Assessment & Plan  Bloody stool  Ceftriaxone and PPI twice daily  Hemoglobin every 8 hours  GI was consulted    5/21/2025  Discontinue ceftriaxone as low suspicion for esophageal variceal bleeding.    Acute blood loss anemia (ABLA)  Assessment & Plan  Underwent embolization by IR for intrapelvic bleeding  Patient developed lower GI bleeding  Ceftriaxone  GI was consulted  Monitor hemoglobin every 8 hours and blood transfusion    5/21/2025  Continue to monitor hemoglobin every 8 hours and transfuse for hemoglobin less than 7.  Patient is at high risk of rebleeding due to necrotic mass in the colon.    Leukocytosis- (present on admission)  Assessment & Plan  Sepsis workup including blood culture pending    Lactic acid acidosis  Assessment & Plan  Improvement in lactic acidosis however still lactic is elevated  Continue IV fluid  Sepsis workup.    Fall  Assessment & Plan  Mechanical fall  PT and OT  Pain control  Trauma board  Patient is at high risk for surgery, orthopedic did not recommend intervention    Prolonged Q-T interval on ECG- (present on admission)  Assessment & Plan  5/20/2025  Avoid QTc prolonging medications    Coagulopathy  (HCC)- (present on admission)  Assessment & Plan  Due to cirrhosis with INR more than 1.8  Consider vitamin K   INR daily    5/20/2025  Continue to monitor INR    Benign prostatic hyperplasia with urinary frequency- (present on admission)  Assessment & Plan  Bladder scan and Flomax if needed    Contraindication to deep vein thrombosis (DVT) prophylaxis- (present on admission)  Assessment & Plan  5/20/2025  Due to ongoing lower GI bleed    Thrombocytopenia (HCC)- (present on admission)  Assessment & Plan  Due to cirrhosis  101    5/22/2025  Stable at 96.  Continue to monitor.    Syncopal episodes- (present on admission)  Assessment & Plan  5/20/2025  As per presentation    Acute kidney injury superimposed on chronic kidney disease (HCC)- (present on admission)  Assessment & Plan  His creatinine around 1.4 and came with creatinine 2.2  Improved with IV fluid  Bladder scan to rule out retention  Avoid nephrotoxic medications  Continue IV fluid    5/20/2025  Creatinine improved to 1.69.  Continue NS at 83 mL/h.    5/22/2025  Creatinine improving to 1.17.  Continuing normal saline at 83 mL/h.  5/21/2025  Creatinine improving to 1.4.  Continue NS at 83 mL/h.    Cirrhosis of liver with ascites (HCC)- (present on admission)  Assessment & Plan  History of cirrhosis due to alcoholism  INR 1.8 and bilirubin more than 2  MELD 24  Check ammonia and started lactulose  No volume overload, start with IV fluid  Suspicious of GI bleeding, start with ceftriaxone and PPI  GI was consulted    5/20/2025  Monitor fluid status very closely.  Not enough ascites for paracentesis.    5/21/2025  Discontinue ceftriaxone due to low suspicion for esophageal variceal bleeding.  Continue to hold home furosemide and spironolactone.    Hematemesis with nausea- (present on admission)  Assessment & Plan  5/21/2025  Resolved         VTE prophylaxis:    pharmacologic prophylaxis contraindicated due to Anemia requiring transfusion      I have performed a  physical exam and reviewed and updated ROS and Plan today (5/22/2025). In review of yesterday's note (5/21/2025), there are no changes except as documented above.      51 minutes spent prepping to see patient (e.g. review of tests) obtaining and/or reviewing separately obtained history. Performing a medically appropriate examination and evaluation.  Counseling and educating the patient/family/caregiver.  Ordering medications, tests, or procedures.  Referring and communicating with other health care professionals.  Documenting clinical information in EPIC.  Independently interpreting results and communicating results to patient/family/caregiver.  Care coordination.

## 2025-05-24 LAB
BACTERIA BLD CULT: NORMAL
SIGNIFICANT IND 70042: NORMAL
SITE SITE: NORMAL
SOURCE SOURCE: NORMAL

## 2025-05-27 LAB
BACTERIA BLD CULT: NORMAL
BACTERIA BLD CULT: NORMAL
SIGNIFICANT IND 70042: NORMAL
SIGNIFICANT IND 70042: NORMAL
SITE SITE: NORMAL
SITE SITE: NORMAL
SOURCE SOURCE: NORMAL
SOURCE SOURCE: NORMAL

## (undated) DEVICE — ELECTRODE 850 FOAM ADHESIVE - HYDROGEL RADIOTRNSPRNT (50/PK)

## (undated) DEVICE — MASK PANORAMIC OXYGEN PRO2 (30EA/CA)

## (undated) DEVICE — CANISTER SUCTION RIGID RED 1500CC (40EA/CA)

## (undated) DEVICE — TOWEL STOP TIMEOUT SAFETY FLAG (40EA/CA)

## (undated) DEVICE — CONTAINER, SPECIMEN, STERILE

## (undated) DEVICE — TUBING CLEARLINK DUO-VENT - C-FLO (48EA/CA)

## (undated) DEVICE — FILM CASSETTE ENDO

## (undated) DEVICE — PORT AUXILLARY WATER (50EA/BX)

## (undated) DEVICE — NEPTUNE 4 PORT MANIFOLD - (20/PK)

## (undated) DEVICE — WATER IRRIGATION STERILE 1000ML (12EA/CA)

## (undated) DEVICE — FORCEP RADIAL JAW 4 STANDARD CAPACITY W/NEEDLE 240CM (40EA/BX)

## (undated) DEVICE — LACTATED RINGERS INJ 1000 ML - (14EA/CA 60CA/PF)

## (undated) DEVICE — TUBE CONNECTING SUCTION - CLEAR PLASTIC STERILE 72 IN (50EA/CA)

## (undated) DEVICE — BUTTON ENDOSCOPY DISPOSABLE

## (undated) DEVICE — TRAP POLYP E-TRAP (25EA/BX)

## (undated) DEVICE — CAPTIVATOR II-15MM ROUND STIFF (40/BX)

## (undated) DEVICE — SENSOR OXIMETER ADULT SPO2 RD SET (20EA/BX)

## (undated) DEVICE — KIT CUSTOM PROCEDURE SINGLE FOR ENDO (15/CA)